# Patient Record
Sex: MALE | Race: WHITE
[De-identification: names, ages, dates, MRNs, and addresses within clinical notes are randomized per-mention and may not be internally consistent; named-entity substitution may affect disease eponyms.]

---

## 2022-03-18 PROBLEM — F90.2 ATTENTION DEFICIT HYPERACTIVITY DISORDER (ADHD), COMBINED TYPE: Status: ACTIVE | Noted: 2022-01-24

## 2022-03-18 PROBLEM — F95.2 TOURETTE'S: Status: ACTIVE | Noted: 2022-01-24

## 2022-03-18 PROBLEM — K21.9 CHRONIC GERD: Status: ACTIVE | Noted: 2022-01-24

## 2022-03-19 PROBLEM — F95.2 TOURETTE'S DISORDER: Status: ACTIVE | Noted: 2022-01-24

## 2022-03-19 PROBLEM — G43.009 MIGRAINE WITHOUT AURA AND WITHOUT STATUS MIGRAINOSUS, NOT INTRACTABLE: Status: ACTIVE | Noted: 2022-01-24

## 2022-03-20 PROBLEM — F41.9 CHRONIC ANXIETY: Status: ACTIVE | Noted: 2022-01-24

## 2022-06-13 ENCOUNTER — OFFICE VISIT (OUTPATIENT)
Dept: FAMILY MEDICINE CLINIC | Facility: CLINIC | Age: 22
End: 2022-06-13
Payer: COMMERCIAL

## 2022-06-13 VITALS
DIASTOLIC BLOOD PRESSURE: 78 MMHG | BODY MASS INDEX: 25.61 KG/M2 | SYSTOLIC BLOOD PRESSURE: 120 MMHG | WEIGHT: 169 LBS | HEIGHT: 68 IN

## 2022-06-13 DIAGNOSIS — L30.9 ECZEMA OF BOTH HANDS: ICD-10-CM

## 2022-06-13 DIAGNOSIS — F95.2 TOURETTE'S DISORDER: ICD-10-CM

## 2022-06-13 DIAGNOSIS — K21.9 CHRONIC GERD: ICD-10-CM

## 2022-06-13 DIAGNOSIS — G43.009 MIGRAINE WITHOUT AURA AND WITHOUT STATUS MIGRAINOSUS, NOT INTRACTABLE: ICD-10-CM

## 2022-06-13 DIAGNOSIS — F41.9 CHRONIC ANXIETY: ICD-10-CM

## 2022-06-13 DIAGNOSIS — F90.2 ATTENTION DEFICIT HYPERACTIVITY DISORDER (ADHD), COMBINED TYPE: Primary | ICD-10-CM

## 2022-06-13 PROCEDURE — 99214 OFFICE O/P EST MOD 30 MIN: CPT | Performed by: FAMILY MEDICINE

## 2022-06-13 RX ORDER — METHYLPHENIDATE HYDROCHLORIDE 10 MG/1
10 TABLET ORAL DAILY
Qty: 30 TABLET | Refills: 0 | Status: SHIPPED | OUTPATIENT
Start: 2022-08-12 | End: 2022-09-13 | Stop reason: SDUPTHER

## 2022-06-13 RX ORDER — METHYLPHENIDATE HYDROCHLORIDE 10 MG/1
10 TABLET ORAL DAILY
Qty: 30 TABLET | Refills: 0 | Status: SHIPPED | OUTPATIENT
Start: 2022-07-13 | End: 2022-09-13 | Stop reason: SDUPTHER

## 2022-06-13 RX ORDER — METHYLPHENIDATE HYDROCHLORIDE 10 MG/1
10 TABLET ORAL DAILY
Qty: 30 TABLET | Refills: 0 | Status: SHIPPED | OUTPATIENT
Start: 2022-06-13 | End: 2022-09-13 | Stop reason: SDUPTHER

## 2022-06-13 NOTE — PROGRESS NOTES
[address] [place] [address]  _______________________________________  Shamar Laws MD                 [address] [place] [address] Drive,  [address]. [place], [facility]                     [address]                                                                                    Phone: [phone]                                                                                    Fax: [phone]    Kane Navarro is a 25 y.o. male who is seen for evaluation of   Chief Complaint   Patient presents with    ADHD     Stable.  Migraine     Improved migraine severity since the last 3 months.  Anxiety     Pt's mom has noticed OCD symptoms/tourretts symptoms are different. Pt's mom has noticed he has to make contact with objects with his feet when walking. He does not like to use his hands to turn on a sink or touch a doorknob. His verbal tic has improved. HPI:   Arnol Chan is a 23 y/o M with a h/o ADD, tourette's, GERD and chronic migraines, here for f/u.      - ADHD- Arnol Chan has been on medication since age 11. Pt has a degree in [employer] and works at [employer] in [facility]. He reports that his s/s are stable on current dose of Ritalin.      - Tourette's/OCD- diagnosed at age 5. Currently on topamax and clonidine. Presents as a cough, which was initially felt to be allergic etiology. Dose was adjusted 1.5 yrs ago, but reports no benefit. OCD usually manifests with frequent hand washing. Mom concerned that pt may be having significantly more anxiety. Recently has been very concerned that if he does not spend enough time praying or reading the Bible, he will spend eternity in hell. Pt agreed to trial on lexapro last visit. Pt and mother, who accompanies pt, feels his anxiety is somewhat improved since starting lexapro.  Mother feels that Arnol Chan has had some more pronounced walking patterns and kicking, which have been associated with tics in the past, but this has not limited his life functions.      - migraines- historically, pt takes Imitrex or naproxyn for h/a. He often has profound nausea with h/a, at times zofran does not resolve nausea and pt has required UC. Pt has had only mild h/a since our last visit, no vomiting, improved from 1-2 h/a monthly.      - GERD- prilosec for a number of years only once daily. Reports s/s are well controlled. - eczema- battled for a number of years. Since switching to free and clear detergent, pt's eczema has greatly improved.      - excessive cerumen- pt has used debrox in the past. Tends to have difficulty clearing. Review of Systems:  Review of Systems     History:  Past Medical History:   Diagnosis Date    ADD (attention deficit disorder)     Allergic rhinitis     Asthma     Migraine     Tourette's        Past Surgical History:   Procedure Laterality Date    ADENOIDECTOMY      IR BRONCHOSCOPY      TYMPANOPLASTY      WISDOM TOOTH EXTRACTION  2019       Family History   Problem Relation Age of Onset    Thyroid Disease Mother     Diabetes Father        Social History     Tobacco Use    Smoking status: Never Smoker    Smokeless tobacco: Never Used   Substance Use Topics    Alcohol use: Not Currently       Allergies   Allergen Reactions    Latex Rash       Current Outpatient Medications   Medication Sig Dispense Refill    methylphenidate (RITALIN) 10 MG tablet Take 1 tablet by mouth daily for 30 days. 30 tablet 0    [START ON 7/13/2022] methylphenidate (RITALIN) 10 MG tablet Take 1 tablet by mouth daily for 30 days. 30 tablet 0    [START ON 8/12/2022] methylphenidate (RITALIN) 10 MG tablet Take 1 tablet by mouth daily for 30 days.  30 tablet 0    cetirizine (ZYRTEC) 10 MG tablet Take 10 mg by mouth daily      cloNIDine (CATAPRES) 0.3 MG tablet Take 0.3 mg by mouth 2 times daily      escitalopram (LEXAPRO) 10 MG tablet Take 10 mg by mouth daily      melatonin 5 MG TABS tablet Take 5 mg by mouth      methylphenidate (RITALIN) 10 MG tablet Take 10 mg by mouth daily.  omeprazole (PRILOSEC) 20 MG delayed release capsule Take 20 mg by mouth daily      promethazine (PHENERGAN) 25 MG suppository Place 25 mg rectally every 6 hours as needed      topiramate (TOPAMAX) 200 MG tablet Take 200 mg by mouth       No current facility-administered medications for this visit. Vitals:    /78 (Site: Left Upper Arm, Position: Sitting, Cuff Size: Small Adult)   Ht 5' 8\" (1.727 m)   Wt 169 lb (76.7 kg)   BMI 25.70 kg/m²     Physical Exam:  Physical Exam  Vitals reviewed. Constitutional:       General: He is not in acute distress. Appearance: Normal appearance. He is not ill-appearing. HENT:      Head: Normocephalic and atraumatic. Right Ear: Tympanic membrane and ear canal normal. There is no impacted cerumen. Left Ear: Tympanic membrane and ear canal normal. There is no impacted cerumen. Nose: No congestion or rhinorrhea. Mouth/Throat:      Mouth: Mucous membranes are moist.      Pharynx: No oropharyngeal exudate or posterior oropharyngeal erythema. Eyes:      General: No scleral icterus. Right eye: No discharge. Left eye: No discharge. Extraocular Movements: Extraocular movements intact. Conjunctiva/sclera: Conjunctivae normal.      Pupils: Pupils are equal, round, and reactive to light. Cardiovascular:      Rate and Rhythm: Normal rate and regular rhythm. Pulses: Normal pulses. Heart sounds: No murmur heard. No friction rub. No gallop. Pulmonary:      Effort: Pulmonary effort is normal. No respiratory distress. Breath sounds: No stridor. No wheezing, rhonchi or rales. Chest:      Chest wall: No tenderness. Abdominal:      General: Abdomen is flat. Bowel sounds are normal. There is no distension. Palpations: Abdomen is soft. There is no mass. Tenderness: There is no abdominal tenderness. There is no right CVA tenderness or left CVA tenderness.    Musculoskeletal: General: No tenderness. Normal range of motion. Cervical back: Normal range of motion and neck supple. No rigidity or tenderness. Right lower leg: No edema. Left lower leg: No edema. Lymphadenopathy:      Cervical: No cervical adenopathy. Skin:     General: Skin is warm and dry. Capillary Refill: Capillary refill takes less than 2 seconds. Findings: No lesion or rash. Neurological:      General: No focal deficit present. Mental Status: He is alert and oriented to person, place, and time. Mental status is at baseline. Sensory: No sensory deficit. Motor: No weakness. Coordination: Coordination normal.      Gait: Gait normal.   Psychiatric:         Mood and Affect: Mood normal.         Behavior: Behavior normal.         Thought Content: Thought content normal.         Judgment: Judgment normal.         Assessment/Plan:     ICD-10-CM    1. Attention deficit hyperactivity disorder (ADHD), combined type  F90.2 methylphenidate (RITALIN) 10 MG tablet     methylphenidate (RITALIN) 10 MG tablet     methylphenidate (RITALIN) 10 MG tablet   2. Tourette's disorder  F95.2    3. Migraine without aura and without status migrainosus, not intractable  G43.009    4. Chronic GERD  K21.9    5. Chronic anxiety  F41.9    6. Eczema of both hands  L30.9         Problem List        Digestive    Chronic GERD      Continue ppi for now. Plan to taper as his anxiety is better controlled. Reinforce lifestyle modifications. Relevant Medications    omeprazole (PRILOSEC) 20 MG delayed release capsule       Nervous and Auditory    Migraine without aura and without status migrainosus, not intractable      Improved frequency. Imitrex + zofran seems to be appropriate abortive tx. Relevant Medications    escitalopram (LEXAPRO) 10 MG tablet    topiramate (TOPAMAX) 200 MG tablet    Tourette's disorder     S/s are stable on this regimen.  Recent increase in tics does not appear to be limiting pt. Will continue to monitor for now. Musculoskeletal and Integument    Eczema of both hands      Improved with changing laundry detergents. Other    Attention deficit hyperactivity disorder (ADHD), combined type - Primary      PDMP queried and appropriate. Will continue same dose. Relevant Medications    methylphenidate (RITALIN) 10 MG tablet    methylphenidate (RITALIN) 10 MG tablet (Start on 7/13/2022)    methylphenidate (RITALIN) 10 MG tablet (Start on 8/12/2022)    Chronic anxiety      Lexapro is currently controlling anxiety very well.           Relevant Medications    escitalopram (LEXAPRO) 10 MG tablet           Twila Condon III, MD

## 2022-06-14 PROBLEM — L30.9 ECZEMA OF BOTH HANDS: Status: ACTIVE | Noted: 2022-06-14

## 2022-06-14 NOTE — ASSESSMENT & PLAN NOTE
Continue ppi for now. Plan to taper as his anxiety is better controlled. Reinforce lifestyle modifications.

## 2022-06-14 NOTE — ASSESSMENT & PLAN NOTE
S/s are stable on this regimen. Recent increase in tics does not appear to be limiting pt. Will continue to monitor for now.

## 2022-09-13 ENCOUNTER — OFFICE VISIT (OUTPATIENT)
Dept: FAMILY MEDICINE CLINIC | Facility: CLINIC | Age: 22
End: 2022-09-13
Payer: COMMERCIAL

## 2022-09-13 VITALS
HEART RATE: 103 BPM | DIASTOLIC BLOOD PRESSURE: 80 MMHG | BODY MASS INDEX: 26.98 KG/M2 | HEIGHT: 68 IN | SYSTOLIC BLOOD PRESSURE: 130 MMHG | WEIGHT: 178 LBS | OXYGEN SATURATION: 99 %

## 2022-09-13 DIAGNOSIS — F90.2 ATTENTION DEFICIT HYPERACTIVITY DISORDER (ADHD), COMBINED TYPE: Primary | ICD-10-CM

## 2022-09-13 DIAGNOSIS — G43.009 MIGRAINE WITHOUT AURA AND WITHOUT STATUS MIGRAINOSUS, NOT INTRACTABLE: ICD-10-CM

## 2022-09-13 DIAGNOSIS — L30.9 ECZEMA OF BOTH HANDS: ICD-10-CM

## 2022-09-13 DIAGNOSIS — L30.9 DERMATITIS: ICD-10-CM

## 2022-09-13 DIAGNOSIS — K21.9 CHRONIC GERD: ICD-10-CM

## 2022-09-13 PROCEDURE — 99214 OFFICE O/P EST MOD 30 MIN: CPT | Performed by: FAMILY MEDICINE

## 2022-09-13 RX ORDER — METHYLPHENIDATE HYDROCHLORIDE 10 MG/1
10 TABLET ORAL DAILY
Qty: 30 TABLET | Refills: 0 | Status: SHIPPED | OUTPATIENT
Start: 2022-11-12 | End: 2022-12-12

## 2022-09-13 RX ORDER — METHYLPHENIDATE HYDROCHLORIDE 10 MG/1
10 TABLET ORAL DAILY
Qty: 30 TABLET | Refills: 0 | Status: SHIPPED | OUTPATIENT
Start: 2022-10-13 | End: 2022-11-12

## 2022-09-13 RX ORDER — SUMATRIPTAN 25 MG/1
25 TABLET, FILM COATED ORAL
Qty: 27 TABLET | Refills: 3 | Status: SHIPPED | OUTPATIENT
Start: 2022-09-13 | End: 2022-09-13

## 2022-09-13 RX ORDER — NAPROXEN 500 MG/1
500 TABLET ORAL 2 TIMES DAILY WITH MEALS
Qty: 180 TABLET | Refills: 1 | Status: SHIPPED | OUTPATIENT
Start: 2022-09-13

## 2022-09-13 RX ORDER — ONDANSETRON 4 MG/1
4 TABLET, FILM COATED ORAL DAILY PRN
Qty: 30 TABLET | Refills: 0 | Status: SHIPPED | OUTPATIENT
Start: 2022-09-13

## 2022-09-13 RX ORDER — METHYLPHENIDATE HYDROCHLORIDE 10 MG/1
10 TABLET ORAL DAILY
Qty: 30 TABLET | Refills: 0 | Status: SHIPPED | OUTPATIENT
Start: 2022-09-13 | End: 2022-10-13

## 2022-09-13 ASSESSMENT — ENCOUNTER SYMPTOMS
CONSTIPATION: 0
COLOR CHANGE: 0
WHEEZING: 0
SHORTNESS OF BREATH: 0
SINUS PAIN: 0
EYE REDNESS: 0
BLOOD IN STOOL: 0
ABDOMINAL PAIN: 0
NAUSEA: 0
EYE ITCHING: 0
VOMITING: 0
DIARRHEA: 0
STRIDOR: 0
SINUS PRESSURE: 0
SORE THROAT: 0
ABDOMINAL DISTENTION: 0
CHEST TIGHTNESS: 0
BACK PAIN: 0
COUGH: 0
EYE PAIN: 0
EYE DISCHARGE: 0
RHINORRHEA: 0
FACIAL SWELLING: 0

## 2022-09-13 NOTE — PROGRESS NOTES
[address] [place] [address]  _______________________________________  Al Nur MD                 [address] [place] [address],  [address]. [place], [place]                     [address]                                                                                    Phone: [phone]                                                                                    Fax: [phone]    Renetta Ferrari is a 25 y.o. male who is seen for evaluation of   Chief Complaint   Patient presents with    ADHD     Doing well on medication, pt feels focused throughout the day. Anxiety     Pt hasn't noted any changes with tics since last visit - he hasn't noticed if he is still kicking objects when walking by things. HPI:   Luis Todd is a 23 y/o M with a h/o ADD, tourette's, GERD and chronic migraines, here for f/u. - ADHD- Luis Todd has been on medication since age 11. Pt has a degree in [employer] and works at [employer] in [employer]. He reports that his s/s are stable on current dose of Ritalin. Denies any medication s/e.      - Tourette's/OCD- diagnosed at age 5. Currently on topamax and clonidine. Presents as a cough, which was initially felt to be allergic etiology. Dose was adjusted 1.5 yrs ago, but reports no benefit. OCD usually manifests with frequent hand washing. In the past, pt was very concerned that if he did not spend enough time praying or reading the Bible, he would spend eternity in hell. Pt agreed to trial on lexapro. Pt feels his anxiety is somewhat improved since starting lexapro. His mother has reported that Luis Todd has had less pronounced walking patterns and kicking, which have been associated with tics in the past, but this has not limited his life functions. - migraines- historically, pt takes Imitrex or naproxyn for h/a. He often has profound nausea with h/a, at times zofran does not resolve nausea and pt has required UC.  Pt has had one h/a since our last visit that was triggered by EToH. He reports vomiting responds to zofran and h/a to imitrex and naprosyn.      - GERD- prilosec for a number of years only once daily. Reports some increased s/s lately, but cannot attribute to any lifestyle/dietary changes. - eczema- battled for a number of years. Since switching to free and clear detergent, pt's eczema has greatly improved. He has noticed a small flare on L wrist and b/l anticubital areas. He uses triamcinolone usually, but has not restarted it.      - excessive cerumen- pt has used debrox in the past. Tends to have difficulty clearing.     - c/o new lesion above lip and sides of nose with bumps. Review of Systems:  Review of Systems   Constitutional:  Negative for activity change, appetite change, chills, diaphoresis, fatigue, fever and unexpected weight change. HENT:  Negative for congestion, ear discharge, ear pain, facial swelling, hearing loss, mouth sores, nosebleeds, postnasal drip, rhinorrhea, sinus pressure, sinus pain, sore throat and tinnitus. Eyes:  Negative for pain, discharge, redness, itching and visual disturbance. Respiratory:  Negative for cough, chest tightness, shortness of breath, wheezing and stridor. Cardiovascular:  Negative for chest pain, palpitations and leg swelling. Gastrointestinal:  Negative for abdominal distention, abdominal pain, blood in stool, constipation, diarrhea, nausea and vomiting. Endocrine: Negative for cold intolerance, heat intolerance, polydipsia, polyphagia and polyuria. Genitourinary:  Negative for decreased urine volume, difficulty urinating, dysuria, flank pain, frequency, hematuria and urgency. Musculoskeletal:  Negative for arthralgias, back pain, gait problem, joint swelling, myalgias and neck pain. Skin:  Positive for rash. Negative for color change, pallor and wound.    Neurological:  Negative for dizziness, tremors, seizures, syncope, facial asymmetry, speech difficulty, weakness, light-headedness, numbness and headaches. Psychiatric/Behavioral:  Negative for agitation, behavioral problems, confusion, hallucinations, self-injury, sleep disturbance and suicidal ideas. The patient is not nervous/anxious. History:  Past Medical History:   Diagnosis Date    ADD (attention deficit disorder)     Allergic rhinitis     Asthma     Migraine     Tourette's        Past Surgical History:   Procedure Laterality Date    ADENOIDECTOMY      IR BRONCHOSCOPY      TYMPANOPLASTY      WISDOM TOOTH EXTRACTION  2019       Family History   Problem Relation Age of Onset    Thyroid Disease Mother     Diabetes Father        Social History     Tobacco Use    Smoking status: Never    Smokeless tobacco: Never   Substance Use Topics    Alcohol use: Not Currently       Allergies   Allergen Reactions    Latex Rash       Current Outpatient Medications   Medication Sig Dispense Refill    [START ON 11/12/2022] methylphenidate (RITALIN) 10 MG tablet Take 1 tablet by mouth daily for 30 days. 30 tablet 0    methylphenidate (RITALIN) 10 MG tablet Take 1 tablet by mouth daily for 30 days. 30 tablet 0    [START ON 10/13/2022] methylphenidate (RITALIN) 10 MG tablet Take 1 tablet by mouth daily for 30 days. 30 tablet 0    naproxen (NAPROSYN) 500 MG tablet Take 1 tablet by mouth 2 times daily (with meals) 180 tablet 1    SUMAtriptan (IMITREX) 25 MG tablet Take 1 tablet by mouth once as needed for Migraine 27 tablet 3    ondansetron (ZOFRAN) 4 MG tablet Take 1 tablet by mouth daily as needed for Nausea or Vomiting 30 tablet 0    mupirocin (BACTROBAN) 2 % ointment Apply topically 3 times daily. 15 g 0    cetirizine (ZYRTEC) 10 MG tablet Take 10 mg by mouth daily      cloNIDine (CATAPRES) 0.3 MG tablet Take 0.3 mg by mouth 2 times daily      escitalopram (LEXAPRO) 10 MG tablet Take 10 mg by mouth daily      melatonin 5 MG TABS tablet Take 5 mg by mouth      methylphenidate (RITALIN) 10 MG tablet Take 10 mg by mouth daily. omeprazole (PRILOSEC) 20 MG delayed release capsule Take 20 mg by mouth daily      promethazine (PHENERGAN) 25 MG suppository Place 25 mg rectally every 6 hours as needed      topiramate (TOPAMAX) 200 MG tablet Take 200 mg by mouth       No current facility-administered medications for this visit. Vitals:    /80 (Site: Left Upper Arm, Position: Sitting, Cuff Size: Small Adult)   Pulse (!) 103   Ht 5' 8\" (1.727 m)   Wt 178 lb (80.7 kg)   SpO2 99%   BMI 27.06 kg/m²     Physical Exam:  Physical Exam  Vitals reviewed. Constitutional:       General: He is not in acute distress. Appearance: Normal appearance. He is not ill-appearing, toxic-appearing or diaphoretic. HENT:      Head: Normocephalic and atraumatic. Right Ear: Tympanic membrane, ear canal and external ear normal. There is no impacted cerumen. Left Ear: Tympanic membrane, ear canal and external ear normal. There is no impacted cerumen. Nose: Nose normal. No congestion or rhinorrhea. Mouth/Throat:      Mouth: Mucous membranes are moist.      Pharynx: No oropharyngeal exudate or posterior oropharyngeal erythema. Eyes:      General: No scleral icterus. Right eye: No discharge. Left eye: No discharge. Extraocular Movements: Extraocular movements intact. Conjunctiva/sclera: Conjunctivae normal.      Pupils: Pupils are equal, round, and reactive to light. Cardiovascular:      Rate and Rhythm: Normal rate and regular rhythm. Pulses: Normal pulses. Heart sounds: Normal heart sounds. No murmur heard. No friction rub. No gallop. Pulmonary:      Effort: Pulmonary effort is normal. No respiratory distress. Breath sounds: Normal breath sounds. No stridor. No wheezing, rhonchi or rales. Chest:      Chest wall: No tenderness. Abdominal:      General: Abdomen is flat. Bowel sounds are normal. There is no distension. Palpations: Abdomen is soft. There is no mass. Tenderness: There is no abdominal tenderness. There is no right CVA tenderness, left CVA tenderness, guarding or rebound. Musculoskeletal:         General: No swelling, tenderness, deformity or signs of injury. Cervical back: Neck supple. No rigidity or tenderness. Right lower leg: No edema. Left lower leg: No edema. Lymphadenopathy:      Cervical: No cervical adenopathy. Skin:     General: Skin is warm and dry. Coloration: Skin is not jaundiced or pale. Findings: No bruising, erythema, lesion or rash. Neurological:      General: No focal deficit present. Mental Status: He is alert. Mental status is at baseline. Motor: No weakness. Coordination: Coordination normal.      Gait: Gait normal.   Psychiatric:         Mood and Affect: Mood normal.         Behavior: Behavior normal.         Thought Content: Thought content normal.         Judgment: Judgment normal.       Assessment/Plan:     ICD-10-CM    1. Attention deficit hyperactivity disorder (ADHD), combined type  F90.2 methylphenidate (RITALIN) 10 MG tablet     methylphenidate (RITALIN) 10 MG tablet     methylphenidate (RITALIN) 10 MG tablet      2. Dermatitis  L30.9 mupirocin (BACTROBAN) 2 % ointment      3. Chronic GERD  K21.9 ondansetron (ZOFRAN) 4 MG tablet      4. Migraine without aura and without status migrainosus, not intractable  G43.009 naproxen (NAPROSYN) 500 MG tablet     SUMAtriptan (IMITREX) 25 MG tablet      5. Eczema of both hands  L30.9            Problem List          Digestive    Chronic GERD      EToH may have triggered some episodes. Continue ppi and reinforced diet. Educated on lifestyle modifications with instructions to reduce large meals, particularly before bedtime; reduce spicy foods, caffeine, alcohol and acidic foods/drinks, avoid smoking.             Relevant Medications    omeprazole (PRILOSEC) 20 MG delayed release capsule    ondansetron (ZOFRAN) 4 MG tablet       Nervous and Auditory Migraine without aura and without status migrainosus, not intractable      H/a frequency improved. Responds well to imitrex. Relevant Medications    escitalopram (LEXAPRO) 10 MG tablet    topiramate (TOPAMAX) 200 MG tablet    naproxen (NAPROSYN) 500 MG tablet    SUMAtriptan (IMITREX) 25 MG tablet       Musculoskeletal and Integument    Eczema of both hands      Restart triamcinolone TID x 7-10 days. Encouraged to switch to hypoallergenic Dove white bar soap, free and clear laundry detergent, avoid fabric softener dryer sheets and fragranced cosmetics, apply petroleum jelly to affected areas prn to keep moist.           Dermatitis      Start bactroban. RTC if worse. Relevant Medications    mupirocin (BACTROBAN) 2 % ointment       Other    Attention deficit hyperactivity disorder (ADHD), combined type - Primary      Stable on this dose of ritalin. Continue same.           Relevant Medications    methylphenidate (RITALIN) 10 MG tablet (Start on 11/12/2022)    methylphenidate (RITALIN) 10 MG tablet    methylphenidate (RITALIN) 10 MG tablet (Start on 10/13/2022)        Seb Ramirez MD

## 2022-09-13 NOTE — ASSESSMENT & PLAN NOTE
EToH may have triggered some episodes. Continue ppi and reinforced diet. Educated on lifestyle modifications with instructions to reduce large meals, particularly before bedtime; reduce spicy foods, caffeine, alcohol and acidic foods/drinks, avoid smoking.

## 2022-09-13 NOTE — ASSESSMENT & PLAN NOTE
Restart triamcinolone TID x 7-10 days.  Encouraged to switch to hypoallergenic Dove white bar soap, free and clear laundry detergent, avoid fabric softener dryer sheets and fragranced cosmetics, apply petroleum jelly to affected areas prn to keep moist.

## 2022-12-13 ENCOUNTER — OFFICE VISIT (OUTPATIENT)
Dept: FAMILY MEDICINE CLINIC | Facility: CLINIC | Age: 22
End: 2022-12-13
Payer: COMMERCIAL

## 2022-12-13 VITALS
BODY MASS INDEX: 27.43 KG/M2 | DIASTOLIC BLOOD PRESSURE: 88 MMHG | SYSTOLIC BLOOD PRESSURE: 130 MMHG | HEART RATE: 77 BPM | OXYGEN SATURATION: 97 % | HEIGHT: 68 IN | WEIGHT: 181 LBS

## 2022-12-13 DIAGNOSIS — J30.2 SEASONAL ALLERGIC RHINITIS, UNSPECIFIED TRIGGER: ICD-10-CM

## 2022-12-13 DIAGNOSIS — F41.9 CHRONIC ANXIETY: ICD-10-CM

## 2022-12-13 DIAGNOSIS — F90.2 ATTENTION DEFICIT HYPERACTIVITY DISORDER (ADHD), COMBINED TYPE: Primary | ICD-10-CM

## 2022-12-13 DIAGNOSIS — K21.9 CHRONIC GERD: ICD-10-CM

## 2022-12-13 DIAGNOSIS — F95.2 TOURETTE'S: ICD-10-CM

## 2022-12-13 DIAGNOSIS — G43.009 MIGRAINE WITHOUT AURA AND WITHOUT STATUS MIGRAINOSUS, NOT INTRACTABLE: ICD-10-CM

## 2022-12-13 PROCEDURE — G8427 DOCREV CUR MEDS BY ELIG CLIN: HCPCS | Performed by: FAMILY MEDICINE

## 2022-12-13 PROCEDURE — G8484 FLU IMMUNIZE NO ADMIN: HCPCS | Performed by: FAMILY MEDICINE

## 2022-12-13 PROCEDURE — 1036F TOBACCO NON-USER: CPT | Performed by: FAMILY MEDICINE

## 2022-12-13 PROCEDURE — G8419 CALC BMI OUT NRM PARAM NOF/U: HCPCS | Performed by: FAMILY MEDICINE

## 2022-12-13 PROCEDURE — 99214 OFFICE O/P EST MOD 30 MIN: CPT | Performed by: FAMILY MEDICINE

## 2022-12-13 RX ORDER — METHYLPHENIDATE HYDROCHLORIDE 10 MG/1
10 TABLET ORAL DAILY
Qty: 30 TABLET | Refills: 0 | Status: SHIPPED | OUTPATIENT
Start: 2023-02-11 | End: 2023-03-13

## 2022-12-13 RX ORDER — METHYLPHENIDATE HYDROCHLORIDE 10 MG/1
10 TABLET ORAL DAILY
Qty: 30 TABLET | Refills: 0 | Status: SHIPPED | OUTPATIENT
Start: 2022-12-13 | End: 2023-01-12

## 2022-12-13 RX ORDER — AZELASTINE 1 MG/ML
1 SPRAY, METERED NASAL 2 TIMES DAILY
Qty: 60 ML | Refills: 1 | Status: SHIPPED | OUTPATIENT
Start: 2022-12-13

## 2022-12-13 RX ORDER — METHYLPHENIDATE HYDROCHLORIDE 10 MG/1
10 TABLET ORAL DAILY
Qty: 30 TABLET | Refills: 0 | Status: SHIPPED | OUTPATIENT
Start: 2023-01-12 | End: 2023-02-11

## 2022-12-13 ASSESSMENT — ENCOUNTER SYMPTOMS
BACK PAIN: 0
COLOR CHANGE: 0
DIARRHEA: 0
EYE PAIN: 0
ABDOMINAL DISTENTION: 0
SHORTNESS OF BREATH: 0
SINUS PRESSURE: 0
EYE ITCHING: 0
VOMITING: 0
STRIDOR: 0
SORE THROAT: 0
WHEEZING: 0
ABDOMINAL PAIN: 0
EYE REDNESS: 0
FACIAL SWELLING: 0
CONSTIPATION: 0
EYE DISCHARGE: 0
RHINORRHEA: 1
CHEST TIGHTNESS: 0
COUGH: 0
SINUS PAIN: 0
BLOOD IN STOOL: 0
NAUSEA: 0

## 2022-12-13 NOTE — ASSESSMENT & PLAN NOTE
Continue ppi. Educated on lifestyle modifications with instructions to reduce large meals, particularly before bedtime; reduce spicy foods, caffeine, alcohol and acidic foods/drinks, avoid smoking.

## 2022-12-13 NOTE — PROGRESS NOTES
[address] [place] [address]  _______________________________________  Deena Hanna MD                 [address] [place] [address],  [address]. [place], [address] [place] [address]                     Hawk Mcgrath [address]                                                                                    Phone: [phone]                                                                                    Fax: [phone]    Jennifer Rincon is a 25 y.o. male who is seen for evaluation of   Chief Complaint   Patient presents with    ADHD    Anxiety    Other     Tourette's  GERD       HPI:   Lex Alfaro is a 23 y/o M with a h/o ADD, tourette's, GERD and chronic migraines, here for f/u. - ADHD- Lex Alfaro has been on medication since age 11. Pt has a degree in [employer] and works at [employer] in [place]. He reports that his s/s are stable on current dose of Ritalin. Denies any medication s/e.      - Tourette's/OCD- diagnosed at age 5. Currently on topamax and clonidine. Presents as a cough, which was initially felt to be allergic etiology. Dose was adjusted 1.5 yrs ago, but reports no benefit. OCD usually manifests with frequent hand washing. In the past, pt was very concerned that if he did not spend enough time praying or reading the Bible, he would spend eternity in hell. Pt agreed to trial on lexapro. Pt feels his anxiety is somewhat improved since starting lexapro. His mother has reported that Lex Alfaro has had less pronounced walking patterns and kicking, which have been associated with tics in the past, but this has not limited his life functions. - migraines- historically, pt takes Imitrex or naproxyn for h/a. He often has profound nausea with h/a, at times zofran does not resolve nausea and pt has required UC. Pt has had one h/a since our last visit that was triggered by EToH. He reports vomiting responds to zofran and h/a to imitrex and naprosyn.      - GERD- prilosec for a number of years only once daily.  Reports some increased s/s lately, but cannot attribute to any lifestyle/dietary changes. - eczema- battled for a number of years. Since switching to free and clear detergent, pt's eczema has greatly improved. He has noticed a small flare on L wrist and b/l anticubital areas. He uses triamcinolone usually, but has not restarted it.      - excessive cerumen- pt has used debrox in the past. Tends to have difficulty clearing.     - c/o rhinorrhea that is blood tinged at times. Pt states he has used flonase in the past, but stopped last summer. He is currently on cetirizine daily. Denies any sinus pain/pressure, sore throat, fever, chills. Review of Systems:  Review of Systems   Constitutional:  Negative for activity change, appetite change, chills, diaphoresis, fatigue, fever and unexpected weight change. HENT:  Positive for rhinorrhea. Negative for congestion, ear discharge, ear pain, facial swelling, hearing loss, mouth sores, nosebleeds, postnasal drip, sinus pressure, sinus pain, sore throat and tinnitus. Eyes:  Negative for pain, discharge, redness, itching and visual disturbance. Respiratory:  Negative for cough, chest tightness, shortness of breath, wheezing and stridor. Cardiovascular:  Negative for chest pain, palpitations and leg swelling. Gastrointestinal:  Negative for abdominal distention, abdominal pain, blood in stool, constipation, diarrhea, nausea and vomiting. Endocrine: Negative for cold intolerance, heat intolerance, polydipsia, polyphagia and polyuria. Genitourinary:  Negative for decreased urine volume, difficulty urinating, dysuria, flank pain, frequency, hematuria and urgency. Musculoskeletal:  Negative for arthralgias, back pain, gait problem, joint swelling, myalgias and neck pain. Skin:  Negative for color change, pallor, rash and wound.    Neurological:  Negative for dizziness, tremors, seizures, syncope, facial asymmetry, speech difficulty, weakness, light-headedness, numbness and headaches. Psychiatric/Behavioral:  Negative for agitation, behavioral problems, confusion, hallucinations, self-injury, sleep disturbance and suicidal ideas. The patient is not nervous/anxious. History:  Past Medical History:   Diagnosis Date    ADD (attention deficit disorder)     Allergic rhinitis     Asthma     Migraine     Tourette's        Past Surgical History:   Procedure Laterality Date    ADENOIDECTOMY      IR BRONCHOSCOPY      TYMPANOPLASTY      WISDOM TOOTH EXTRACTION  2019       Family History   Problem Relation Age of Onset    Thyroid Disease Mother     Diabetes Father        Social History     Tobacco Use    Smoking status: Never    Smokeless tobacco: Never   Substance Use Topics    Alcohol use: Not Currently       Allergies   Allergen Reactions    Latex Rash       Current Outpatient Medications   Medication Sig Dispense Refill    methylphenidate (RITALIN) 10 MG tablet Take 1 tablet by mouth daily for 30 days. 30 tablet 0    [START ON 1/12/2023] methylphenidate (RITALIN) 10 MG tablet Take 1 tablet by mouth daily for 30 days. 30 tablet 0    [START ON 2/11/2023] methylphenidate (RITALIN) 10 MG tablet Take 1 tablet by mouth daily for 30 days. 30 tablet 0    azelastine (ASTELIN) 0.1 % nasal spray 1 spray by Nasal route 2 times daily Use in each nostril as directed 60 mL 1    naproxen (NAPROSYN) 500 MG tablet Take 1 tablet by mouth 2 times daily (with meals) 180 tablet 1    ondansetron (ZOFRAN) 4 MG tablet Take 1 tablet by mouth daily as needed for Nausea or Vomiting 30 tablet 0    cetirizine (ZYRTEC) 10 MG tablet Take 10 mg by mouth daily      cloNIDine (CATAPRES) 0.3 MG tablet Take 0.3 mg by mouth 2 times daily      escitalopram (LEXAPRO) 10 MG tablet Take 10 mg by mouth daily      melatonin 5 MG TABS tablet Take 5 mg by mouth      methylphenidate (RITALIN) 10 MG tablet Take 10 mg by mouth daily.       omeprazole (PRILOSEC) 20 MG delayed release capsule Take 20 mg by mouth daily      promethazine (PHENERGAN) 25 MG suppository Place 25 mg rectally every 6 hours as needed      topiramate (TOPAMAX) 200 MG tablet Take 200 mg by mouth      SUMAtriptan (IMITREX) 25 MG tablet Take 1 tablet by mouth once as needed for Migraine 27 tablet 3     No current facility-administered medications for this visit. Vitals:    /88 (Site: Right Upper Arm, Position: Sitting, Cuff Size: Small Adult)   Pulse 77   Ht 5' 8\" (1.727 m)   Wt 181 lb (82.1 kg)   SpO2 97%   BMI 27.52 kg/m²     Physical Exam:  Physical Exam  Vitals reviewed. Constitutional:       General: He is not in acute distress. Appearance: Normal appearance. He is not ill-appearing, toxic-appearing or diaphoretic. HENT:      Head: Normocephalic and atraumatic. Right Ear: Tympanic membrane, ear canal and external ear normal. There is no impacted cerumen. Left Ear: Tympanic membrane, ear canal and external ear normal. There is no impacted cerumen. Nose: Rhinorrhea present. No congestion. Right Sinus: No maxillary sinus tenderness or frontal sinus tenderness. Left Sinus: No maxillary sinus tenderness or frontal sinus tenderness. Comments: Mild R septal injection. Mouth/Throat:      Mouth: Mucous membranes are moist.      Pharynx: No oropharyngeal exudate or posterior oropharyngeal erythema. Eyes:      General: No scleral icterus. Right eye: No discharge. Left eye: No discharge. Extraocular Movements: Extraocular movements intact. Conjunctiva/sclera: Conjunctivae normal.      Pupils: Pupils are equal, round, and reactive to light. Cardiovascular:      Rate and Rhythm: Normal rate and regular rhythm. Pulses: Normal pulses. Heart sounds: Normal heart sounds. No murmur heard. No friction rub. No gallop. Pulmonary:      Effort: Pulmonary effort is normal. No respiratory distress. Breath sounds: Normal breath sounds. No stridor. No wheezing, rhonchi or rales.    Chest: Chest wall: No tenderness. Abdominal:      General: Abdomen is flat. Bowel sounds are normal. There is no distension. Palpations: Abdomen is soft. There is no mass. Tenderness: There is no abdominal tenderness. There is no right CVA tenderness, left CVA tenderness, guarding or rebound. Musculoskeletal:         General: No swelling, tenderness, deformity or signs of injury. Cervical back: Neck supple. No rigidity or tenderness. Right lower leg: No edema. Left lower leg: No edema. Lymphadenopathy:      Cervical: No cervical adenopathy. Skin:     General: Skin is warm and dry. Coloration: Skin is not jaundiced or pale. Findings: No bruising, erythema, lesion or rash. Neurological:      General: No focal deficit present. Mental Status: He is alert. Mental status is at baseline. Motor: No weakness. Coordination: Coordination normal.      Gait: Gait normal.   Psychiatric:         Mood and Affect: Mood normal.         Behavior: Behavior normal.         Thought Content: Thought content normal.         Judgment: Judgment normal.       Assessment/Plan:     ICD-10-CM    1. Attention deficit hyperactivity disorder (ADHD), combined type  F90.2 methylphenidate (RITALIN) 10 MG tablet     methylphenidate (RITALIN) 10 MG tablet     methylphenidate (RITALIN) 10 MG tablet      2. Seasonal allergic rhinitis, unspecified trigger  J30.2 azelastine (ASTELIN) 0.1 % nasal spray      3. Chronic GERD  K21.9       4. Tourette's  F95.2       5. Migraine without aura and without status migrainosus, not intractable  G43.009       6. Chronic anxiety  F41.9            Problem List          Respiratory    Seasonal allergic rhinitis      Recommended saline nasal mist and will start him on azelastine.           Relevant Medications    cetirizine (ZYRTEC) 10 MG tablet    promethazine (PHENERGAN) 25 MG suppository    azelastine (ASTELIN) 0.1 % nasal spray       Digestive    Chronic GERD Continue ppi. Educated on lifestyle modifications with instructions to reduce large meals, particularly before bedtime; reduce spicy foods, caffeine, alcohol and acidic foods/drinks, avoid smoking. Relevant Medications    omeprazole (PRILOSEC) 20 MG delayed release capsule    ondansetron (ZOFRAN) 4 MG tablet       Nervous and Auditory    Tourette's      Stable on current regimen. Migraine without aura and without status migrainosus, not intractable      Only a mild h/a since last visit. Did not require imitrex. Appears to be well controlled. Relevant Medications    escitalopram (LEXAPRO) 10 MG tablet    topiramate (TOPAMAX) 200 MG tablet    naproxen (NAPROSYN) 500 MG tablet       Other    Attention deficit hyperactivity disorder (ADHD), combined type - Primary      Doing well on current dose. No changes. Relevant Medications    methylphenidate (RITALIN) 10 MG tablet    methylphenidate (RITALIN) 10 MG tablet (Start on 1/12/2023)    methylphenidate (RITALIN) 10 MG tablet (Start on 2/11/2023)    Chronic anxiety      Stable on lexapro. Continue same dose.           Relevant Medications    escitalopram (LEXAPRO) 10 MG tablet        Gigi Wills III, MD

## 2023-01-10 ENCOUNTER — TELEMEDICINE (OUTPATIENT)
Dept: FAMILY MEDICINE CLINIC | Facility: CLINIC | Age: 23
End: 2023-01-10
Payer: COMMERCIAL

## 2023-01-10 DIAGNOSIS — J02.9 SORE THROAT: ICD-10-CM

## 2023-01-10 DIAGNOSIS — J01.90 ACUTE BACTERIAL SINUSITIS: Primary | ICD-10-CM

## 2023-01-10 DIAGNOSIS — R68.89 FLU-LIKE SYMPTOMS: ICD-10-CM

## 2023-01-10 DIAGNOSIS — Z11.52 ENCOUNTER FOR SCREENING FOR COVID-19: ICD-10-CM

## 2023-01-10 DIAGNOSIS — B96.89 ACUTE BACTERIAL SINUSITIS: Primary | ICD-10-CM

## 2023-01-10 LAB
EXP DATE SOLUTION: NORMAL
EXP DATE SWAB: NORMAL
EXPIRATION DATE: NORMAL
GROUP A STREP ANTIGEN, POC: NORMAL
LOT NUMBER POC: NORMAL
LOT NUMBER SOLUTION: NORMAL
LOT NUMBER SWAB: NORMAL
QUICKVUE INFLUENZA TEST: NEGATIVE
SARS-COV-2 RNA, POC: NEGATIVE
VALID INTERNAL CONTROL, POC: YES
VALID INTERNAL CONTROL, POC: YES

## 2023-01-10 PROCEDURE — G8427 DOCREV CUR MEDS BY ELIG CLIN: HCPCS | Performed by: FAMILY MEDICINE

## 2023-01-10 PROCEDURE — 87804 INFLUENZA ASSAY W/OPTIC: CPT | Performed by: FAMILY MEDICINE

## 2023-01-10 PROCEDURE — 87635 SARS-COV-2 COVID-19 AMP PRB: CPT | Performed by: FAMILY MEDICINE

## 2023-01-10 PROCEDURE — 99214 OFFICE O/P EST MOD 30 MIN: CPT | Performed by: FAMILY MEDICINE

## 2023-01-10 PROCEDURE — 87880 STREP A ASSAY W/OPTIC: CPT | Performed by: FAMILY MEDICINE

## 2023-01-10 RX ORDER — FLUTICASONE PROPIONATE 50 MCG
1 SPRAY, SUSPENSION (ML) NASAL DAILY
Qty: 32 G | Refills: 1 | Status: SHIPPED | OUTPATIENT
Start: 2023-01-10

## 2023-01-10 RX ORDER — AMOXICILLIN 875 MG/1
875 TABLET, COATED ORAL 2 TIMES DAILY
Qty: 14 TABLET | Refills: 0 | Status: SHIPPED | OUTPATIENT
Start: 2023-01-10 | End: 2023-01-17

## 2023-01-10 ASSESSMENT — ENCOUNTER SYMPTOMS
SHORTNESS OF BREATH: 0
ABDOMINAL PAIN: 0
VOMITING: 0
EYE PAIN: 0
DIARRHEA: 0
FACIAL SWELLING: 0
WHEEZING: 0
EYE REDNESS: 0
SINUS PAIN: 1
CHEST TIGHTNESS: 0
RHINORRHEA: 1
CONSTIPATION: 0
SORE THROAT: 1
BACK PAIN: 0
NAUSEA: 0
ABDOMINAL DISTENTION: 0
BLOOD IN STOOL: 0
EYE ITCHING: 0
COUGH: 1
COLOR CHANGE: 0
EYE DISCHARGE: 0
STRIDOR: 0
SINUS PRESSURE: 1

## 2023-01-10 NOTE — LETTER
[facility]  Phone: [phone]  Fax: [phone]    Hanna Christopher MD        January 10, 2023     Patient: Carlos Enrique Price   YOB: 2000   Date of Visit: 1/10/2023       To Whom It May Concern: It is my medical opinion that Elisa Leblanc may return to work on 1/11/23. If you have any questions or concerns, please don't hesitate to call.     Sincerely,        Hanna Christopher MD

## 2023-01-10 NOTE — PROGRESS NOTES
Kerwin Mckeon is a 25 y.o. male who was seen by synchronous (real-time) audio-video technology on 1/10/2023 for No chief complaint on file. Subjective:   C/o 1 week h/o progressively worsening sinus pain, sore throat, purulent rhinorrhea, productive cough with yellow sputum. Denies fever, chills, sob, wheezing. Results for orders placed or performed in visit on 01/10/23   AMB POC RAPID STREP A   Result Value Ref Range    Valid Internal Control, POC yes     Group A Strep Antigen, POC Neg-culture sent Negative   AMB POC COVID-19 COV   Result Value Ref Range    SARS-COV-2 RNA, POC Negative     Lot number swab [id number]     EXP date swab 2/20/23     Lot number solution      EXP date solution      LOT NUMBER POC      EXPIRATION DATE     AMB POC RAPID INFLUENZA TEST   Result Value Ref Range    Valid Internal Control, POC yes     QuickVue Influenza test Negative Negative       Prior to Admission medications    Medication Sig Start Date End Date Taking? Authorizing Provider   amoxicillin (AMOXIL) 875 MG tablet Take 1 tablet by mouth 2 times daily for 7 days 1/10/23 1/17/23 Yes Jian Feliciano III, MD   fluticasone [facility]) 50 MCG/ACT nasal spray 1 spray by Each Nostril route daily 1/10/23  Yes Chapis Pickett III, MD   methylphenidate (RITALIN) 10 MG tablet Take 1 tablet by mouth daily for 30 days. 12/13/22 1/12/23  Jian Feliciano III, MD   methylphenidate (RITALIN) 10 MG tablet Take 1 tablet by mouth daily for 30 days. 1/12/23 2/11/23  Jian Feliciano III, MD   methylphenidate (RITALIN) 10 MG tablet Take 1 tablet by mouth daily for 30 days.  2/11/23 3/13/23  Jian Feliciano III, MD   azelastine (ASTELIN) 0.1 % nasal spray 1 spray by Nasal route 2 times daily Use in each nostril as directed 12/13/22   Chapis Pickett III, MD   naproxen (NAPROSYN) 500 MG tablet Take 1 tablet by mouth 2 times daily (with meals) 9/13/22   Chapis Pickett III, MD   SUMAtriptan (IMITREX) 25 MG tablet Take 1 tablet by mouth once as needed for Migraine 9/13/22 9/13/22  Alma Ulloa III, MD   ondansetron [facility]) 4 MG tablet Take 1 tablet by mouth daily as needed for Nausea or Vomiting 9/13/22   Alma Ulloa III, MD   cetirizine (ZYRTEC) 10 MG tablet Take 10 mg by mouth daily    Ar Automatic Reconciliation   cloNIDine (CATAPRES) 0.3 MG tablet Take 0.3 mg by mouth 2 times daily 1/24/22   Ar Automatic Reconciliation   escitalopram (LEXAPRO) 10 MG tablet Take 10 mg by mouth daily 1/24/22   Ar Automatic Reconciliation   melatonin 5 MG TABS tablet Take 5 mg by mouth    Ar Automatic Reconciliation   methylphenidate (RITALIN) 10 MG tablet Take 10 mg by mouth daily. 3/30/22   Ar Automatic Reconciliation   omeprazole (PRILOSEC) 20 MG delayed release capsule Take 20 mg by mouth daily 1/24/22   Ar Automatic Reconciliation   promethazine (PHENERGAN) 25 MG suppository Place 25 mg rectally every 6 hours as needed 3/7/22   Ar Automatic Reconciliation   topiramate (TOPAMAX) 200 MG tablet Take 200 mg by mouth 1/24/22   Ar Automatic Reconciliation         Review of Systems   Constitutional:  Negative for activity change, appetite change, chills, diaphoresis, fatigue, fever and unexpected weight change. HENT:  Positive for congestion, postnasal drip, rhinorrhea, sinus pressure, sinus pain and sore throat. Negative for ear discharge, ear pain, facial swelling, hearing loss, mouth sores, nosebleeds and tinnitus. Eyes:  Negative for pain, discharge, redness, itching and visual disturbance. Respiratory:  Positive for cough. Negative for chest tightness, shortness of breath, wheezing and stridor. Cardiovascular:  Negative for chest pain, palpitations and leg swelling. Gastrointestinal:  Negative for abdominal distention, abdominal pain, blood in stool, constipation, diarrhea, nausea and vomiting. Endocrine: Negative for cold intolerance, heat intolerance, polydipsia, polyphagia and polyuria.    Genitourinary:  Negative for decreased urine volume, difficulty urinating, dysuria, flank pain, frequency, hematuria and urgency. Musculoskeletal:  Negative for arthralgias, back pain, gait problem, joint swelling, myalgias and neck pain. Skin:  Negative for color change, pallor, rash and wound. Neurological:  Negative for dizziness, tremors, seizures, syncope, facial asymmetry, speech difficulty, weakness, light-headedness, numbness and headaches. Psychiatric/Behavioral:  Negative for agitation, behavioral problems, confusion, hallucinations, self-injury, sleep disturbance and suicidal ideas. The patient is not nervous/anxious. Objective:   No flowsheet data found.      [INSTRUCTIONS:  \"[x]\" Indicates a positive item  \"[]\" Indicates a negative item  -- DELETE ALL ITEMS NOT EXAMINED]    Constitutional: [x] Appears well-developed and well-nourished [x] No apparent distress      [] Abnormal -     Mental status: [x] Alert and awake  [x] Oriented to person/place/time [x] Able to follow commands    [] Abnormal -     Eyes:   EOM    [x]  Normal    [] Abnormal -   Sclera  [x]  Normal    [] Abnormal -          Discharge [x]  None visible   [] Abnormal -     HENT: [x] Normocephalic, atraumatic  [] Abnormal -       External Ears [x] Normal  [] Abnormal -    Neck: [x] No visualized mass [] Abnormal -     Pulmonary/Chest: [x] Respiratory effort normal   [x] No visualized signs of difficulty breathing or respiratory distress        [] Abnormal -      Musculoskeletal:   [] Normal gait with no signs of ataxia         [x] Normal range of motion of neck        [] Abnormal -     Neurological:        [x] No Facial Asymmetry (Cranial nerve 7 motor function) (limited exam due to video visit)          [x] No gaze palsy        [] Abnormal -          Skin:        [x] No significant exanthematous lesions or discoloration noted on facial skin         [] Abnormal -            Psychiatric:       [x] Normal Affect [] Abnormal -        [x] No Hallucinations    Other pertinent observable physical exam findings:-    Diagnoses and all orders for this visit:    Acute bacterial sinusitis  -     amoxicillin (AMOXIL) 875 MG tablet; Take 1 tablet by mouth 2 times daily for 7 days  -     fluticasone (FLONASE) 50 MCG/ACT nasal spray; 1 spray by Each Nostril route daily    Encounter for screening for COVID-19  -     AMB POC COVID-19 COV    Flu-like symptoms  -     AMB POC RAPID INFLUENZA TEST; Future  -     AMB POC RAPID INFLUENZA TEST    Sore throat  -     AMB POC RAPID STREP A  -     Cancel: Culture, Throat; Future         Acute bacterial sinusitis   Given duration of s/s, will start abx tx. Explained importance of taking abx as prescribed and taking entire rx, increase hydration to at least 1-2 L of fluid daily. Recommend starting flonase 1 spray each nostril daily while on abx, over the counter antihistamine and saline nasal spray as needed for nasal dryness. We discussed the expected course, resolution and complications of the diagnosis(es) in detail. Medication risks, benefits, costs, interactions, and alternatives were discussed as indicated. I advised him to contact the office if his condition worsens, changes or fails to improve as anticipated. He expressed understanding with the diagnosis(es) and plan. Tonya Dixon, was evaluated through a synchronous (real-time) audio-video encounter. The patient (or guardian if applicable) is aware that this is a billable service. Verbal consent to proceed has been obtained within the past 12 months. The visit was conducted pursuant to the emergency declaration under the [address] [place] [address] authority and the [employer] and [employer] General Act. Patient identification was verified, and a caregiver was present when appropriate. The patient was located in a state where the provider was credentialed to provide care.     This visit was completely virtually using doxy. kenneth Dorsey MD

## 2023-03-06 RX ORDER — CLONIDINE HYDROCHLORIDE 0.3 MG/1
TABLET ORAL
Qty: 60 TABLET | Refills: 0 | Status: SHIPPED | OUTPATIENT
Start: 2023-03-06 | End: 2023-03-09 | Stop reason: SDUPTHER

## 2023-03-06 RX ORDER — ESCITALOPRAM OXALATE 10 MG/1
TABLET ORAL
Qty: 30 TABLET | Refills: 0 | Status: SHIPPED | OUTPATIENT
Start: 2023-03-06 | End: 2023-03-09 | Stop reason: SDUPTHER

## 2023-03-06 RX ORDER — OMEPRAZOLE 20 MG/1
CAPSULE, DELAYED RELEASE ORAL
Qty: 30 CAPSULE | Refills: 0 | Status: SHIPPED | OUTPATIENT
Start: 2023-03-06 | End: 2023-03-09 | Stop reason: SDUPTHER

## 2023-03-09 ENCOUNTER — OFFICE VISIT (OUTPATIENT)
Dept: FAMILY MEDICINE CLINIC | Facility: CLINIC | Age: 23
End: 2023-03-09
Payer: COMMERCIAL

## 2023-03-09 VITALS
SYSTOLIC BLOOD PRESSURE: 138 MMHG | WEIGHT: 186 LBS | BODY MASS INDEX: 28.19 KG/M2 | DIASTOLIC BLOOD PRESSURE: 82 MMHG | HEIGHT: 68 IN | OXYGEN SATURATION: 98 % | HEART RATE: 94 BPM

## 2023-03-09 DIAGNOSIS — F41.9 CHRONIC ANXIETY: ICD-10-CM

## 2023-03-09 DIAGNOSIS — K21.9 CHRONIC GERD: ICD-10-CM

## 2023-03-09 DIAGNOSIS — F95.2 TOURETTE'S DISORDER: ICD-10-CM

## 2023-03-09 DIAGNOSIS — G43.009 MIGRAINE WITHOUT AURA AND WITHOUT STATUS MIGRAINOSUS, NOT INTRACTABLE: ICD-10-CM

## 2023-03-09 DIAGNOSIS — J30.2 SEASONAL ALLERGIC RHINITIS, UNSPECIFIED TRIGGER: ICD-10-CM

## 2023-03-09 DIAGNOSIS — F90.2 ATTENTION DEFICIT HYPERACTIVITY DISORDER (ADHD), COMBINED TYPE: Primary | ICD-10-CM

## 2023-03-09 PROCEDURE — 1036F TOBACCO NON-USER: CPT | Performed by: FAMILY MEDICINE

## 2023-03-09 PROCEDURE — G8427 DOCREV CUR MEDS BY ELIG CLIN: HCPCS | Performed by: FAMILY MEDICINE

## 2023-03-09 PROCEDURE — G8484 FLU IMMUNIZE NO ADMIN: HCPCS | Performed by: FAMILY MEDICINE

## 2023-03-09 PROCEDURE — G8419 CALC BMI OUT NRM PARAM NOF/U: HCPCS | Performed by: FAMILY MEDICINE

## 2023-03-09 PROCEDURE — 99214 OFFICE O/P EST MOD 30 MIN: CPT | Performed by: FAMILY MEDICINE

## 2023-03-09 RX ORDER — ESCITALOPRAM OXALATE 10 MG/1
TABLET ORAL
Qty: 90 TABLET | Refills: 3 | Status: SHIPPED | OUTPATIENT
Start: 2023-03-09

## 2023-03-09 RX ORDER — METHYLPHENIDATE HYDROCHLORIDE 10 MG/1
10 TABLET ORAL 2 TIMES DAILY
Qty: 60 TABLET | Refills: 0 | Status: SHIPPED | OUTPATIENT
Start: 2023-05-08 | End: 2023-06-07

## 2023-03-09 RX ORDER — NAPROXEN 500 MG/1
500 TABLET ORAL 2 TIMES DAILY WITH MEALS
Qty: 180 TABLET | Refills: 3 | Status: SHIPPED | OUTPATIENT
Start: 2023-03-09

## 2023-03-09 RX ORDER — SUMATRIPTAN 25 MG/1
25 TABLET, FILM COATED ORAL
Qty: 27 TABLET | Refills: 3 | Status: SHIPPED | OUTPATIENT
Start: 2023-03-09 | End: 2023-03-09

## 2023-03-09 RX ORDER — CLONIDINE HYDROCHLORIDE 0.3 MG/1
TABLET ORAL
Qty: 180 TABLET | Refills: 3 | Status: SHIPPED | OUTPATIENT
Start: 2023-03-09

## 2023-03-09 RX ORDER — METHYLPHENIDATE HYDROCHLORIDE 10 MG/1
10 TABLET ORAL 2 TIMES DAILY
Qty: 60 TABLET | Refills: 0 | Status: SHIPPED | OUTPATIENT
Start: 2023-03-09 | End: 2023-04-08

## 2023-03-09 RX ORDER — AZELASTINE 1 MG/ML
1 SPRAY, METERED NASAL 2 TIMES DAILY
Qty: 60 ML | Refills: 1 | Status: SHIPPED | OUTPATIENT
Start: 2023-03-09

## 2023-03-09 RX ORDER — OMEPRAZOLE 20 MG/1
CAPSULE, DELAYED RELEASE ORAL
Qty: 90 CAPSULE | Refills: 3 | Status: SHIPPED | OUTPATIENT
Start: 2023-03-09

## 2023-03-09 RX ORDER — METHYLPHENIDATE HYDROCHLORIDE 10 MG/1
10 TABLET ORAL 2 TIMES DAILY
Qty: 60 TABLET | Refills: 0 | Status: SHIPPED | OUTPATIENT
Start: 2023-04-08 | End: 2023-05-08

## 2023-03-09 RX ORDER — ONDANSETRON 4 MG/1
4 TABLET, FILM COATED ORAL DAILY PRN
Qty: 30 TABLET | Refills: 3 | Status: SHIPPED | OUTPATIENT
Start: 2023-03-09

## 2023-03-09 RX ORDER — PROMETHAZINE HYDROCHLORIDE 25 MG/1
25 SUPPOSITORY RECTAL EVERY 6 HOURS PRN
Qty: 12 SUPPOSITORY | Refills: 3 | Status: CANCELLED | OUTPATIENT
Start: 2023-03-09 | End: 2023-06-07

## 2023-03-09 RX ORDER — FLUTICASONE PROPIONATE 50 MCG
1 SPRAY, SUSPENSION (ML) NASAL DAILY
Qty: 16 G | Refills: 3 | Status: SHIPPED | OUTPATIENT
Start: 2023-03-09

## 2023-03-09 SDOH — ECONOMIC STABILITY: FOOD INSECURITY: WITHIN THE PAST 12 MONTHS, THE FOOD YOU BOUGHT JUST DIDN'T LAST AND YOU DIDN'T HAVE MONEY TO GET MORE.: NEVER TRUE

## 2023-03-09 SDOH — ECONOMIC STABILITY: INCOME INSECURITY: HOW HARD IS IT FOR YOU TO PAY FOR THE VERY BASICS LIKE FOOD, HOUSING, MEDICAL CARE, AND HEATING?: NOT HARD AT ALL

## 2023-03-09 SDOH — ECONOMIC STABILITY: FOOD INSECURITY: WITHIN THE PAST 12 MONTHS, YOU WORRIED THAT YOUR FOOD WOULD RUN OUT BEFORE YOU GOT MONEY TO BUY MORE.: NEVER TRUE

## 2023-03-09 SDOH — ECONOMIC STABILITY: HOUSING INSECURITY
IN THE LAST 12 MONTHS, WAS THERE A TIME WHEN YOU DID NOT HAVE A STEADY PLACE TO SLEEP OR SLEPT IN A SHELTER (INCLUDING NOW)?: NO

## 2023-03-09 ASSESSMENT — ENCOUNTER SYMPTOMS
VOMITING: 0
RHINORRHEA: 0
SHORTNESS OF BREATH: 0
EYE DISCHARGE: 0
FACIAL SWELLING: 0
SINUS PRESSURE: 0
ABDOMINAL PAIN: 0
CHEST TIGHTNESS: 0
EYE REDNESS: 0
COLOR CHANGE: 0
SORE THROAT: 0
BLOOD IN STOOL: 0
STRIDOR: 0
CONSTIPATION: 0
NAUSEA: 0
EYE ITCHING: 0
BACK PAIN: 0
ABDOMINAL DISTENTION: 0
COUGH: 0
SINUS PAIN: 0
EYE PAIN: 0
WHEEZING: 0
DIARRHEA: 0

## 2023-03-09 ASSESSMENT — PATIENT HEALTH QUESTIONNAIRE - PHQ9
SUM OF ALL RESPONSES TO PHQ9 QUESTIONS 1 & 2: 0
SUM OF ALL RESPONSES TO PHQ QUESTIONS 1-9: 0
1. LITTLE INTEREST OR PLEASURE IN DOING THINGS: 0
2. FEELING DOWN, DEPRESSED OR HOPELESS: 0
SUM OF ALL RESPONSES TO PHQ QUESTIONS 1-9: 0

## 2023-03-09 NOTE — ASSESSMENT & PLAN NOTE
Improved on ppi. Educated on lifestyle modifications with instructions to reduce large meals, particularly before bedtime; reduce spicy foods, caffeine, alcohol and acidic foods/drinks, avoid smoking.

## 2023-03-09 NOTE — PROGRESS NOTES
[address] [place] [address]  _______________________________________  Ean Lucero MD                 [address] [place] [address],  [address]. [place], [address] [place] [address]                     Hawk Mcgrath [address]                                                                                    Phone: [phone]                                                                                    Fax: [phone]    Tyson Trejo is a 21 y.o. male who is seen for evaluation of   Chief Complaint   Patient presents with    ADHD     Pt started night classes and would like to increase Ritalin dose    Anxiety    Migraine       HPI:   Chaya Callejas is a 23 y/o M with a h/o ADD, tourette's, GERD and chronic migraines, here for f/u. - ADHD- Chaya Callejas has been on medication since age 11. Pt has a degree in [employer] and works at [employer] in [facility]. He  has started back to school for a [employer] and feels his dose needs to be increased. Denies any medication s/e.      - Tourette's/OCD- diagnosed at age 5. Currently on topamax and clonidine. Presents as a cough, which was initially felt to be allergic etiology. Dose was adjusted 1.5 yrs ago, but reports no benefit. OCD usually manifests with frequent hand washing. In the past, pt was very concerned that if he did not spend enough time praying or reading the Bible, he would spend eternity in hell. Pt agreed to trial on lexapro. Pt feels his anxiety is somewhat improved since starting lexapro. His mother has reported that Chaya Callejas has had less pronounced walking patterns and kicking, which have been associated with tics in the past, but this has not limited his life functions. - migraines- historically, pt takes Imitrex or naproxyn for h/a. He often has profound nausea with h/a, at times zofran does not resolve nausea and pt has required UC. Pt has had one h/a since our last visit that was triggered by EToH.  He reports vomiting responds to zofran and h/a to imitrex and naprosyn.      - GERD- prilosec for a number of years only once daily. Reports some increased s/s lately, but cannot attribute to any lifestyle/dietary changes. - eczema- battled for a number of years. Since switching to free and clear detergent, pt's eczema has greatly improved. Responding well to triamcinolone prn.      - excessive cerumen- pt has used debrox in the past. Tends to have difficulty clearing. Review of Systems:  Review of Systems   Constitutional:  Negative for activity change, appetite change, chills, diaphoresis, fatigue, fever and unexpected weight change. HENT:  Negative for congestion, ear discharge, ear pain, facial swelling, hearing loss, mouth sores, nosebleeds, postnasal drip, rhinorrhea, sinus pressure, sinus pain, sore throat and tinnitus. Eyes:  Negative for pain, discharge, redness, itching and visual disturbance. Respiratory:  Negative for cough, chest tightness, shortness of breath, wheezing and stridor. Cardiovascular:  Negative for chest pain, palpitations and leg swelling. Gastrointestinal:  Negative for abdominal distention, abdominal pain, blood in stool, constipation, diarrhea, nausea and vomiting. Endocrine: Negative for cold intolerance, heat intolerance, polydipsia, polyphagia and polyuria. Genitourinary:  Negative for decreased urine volume, difficulty urinating, dysuria, flank pain, frequency, hematuria and urgency. Musculoskeletal:  Negative for arthralgias, back pain, gait problem, joint swelling, myalgias and neck pain. Skin:  Negative for color change, pallor, rash and wound. Neurological:  Negative for dizziness, tremors, seizures, syncope, facial asymmetry, speech difficulty, weakness, light-headedness, numbness and headaches. Psychiatric/Behavioral:  Negative for agitation, behavioral problems, confusion, hallucinations, self-injury, sleep disturbance and suicidal ideas. The patient is not nervous/anxious.        History:  Past Medical History:   Diagnosis Date    ADD (attention deficit disorder)     Allergic rhinitis     Asthma     Migraine     Tourette's        Past Surgical History:   Procedure Laterality Date    ADENOIDECTOMY      IR BRONCHOSCOPY      TYMPANOPLASTY      WISDOM TOOTH EXTRACTION  2019       Family History   Problem Relation Age of Onset    Thyroid Disease Mother     Diabetes Father        Social History     Tobacco Use    Smoking status: Never    Smokeless tobacco: Never   Substance Use Topics    Alcohol use: Not Currently       Allergies   Allergen Reactions    Latex Rash       Current Outpatient Medications   Medication Sig Dispense Refill    omeprazole (PRILOSEC) 20 MG delayed release capsule Take 1 capsule by mouth once daily 90 capsule 3    cloNIDine (CATAPRES) 0.3 MG tablet Take 1 tablet by mouth twice daily 180 tablet 3    escitalopram (LEXAPRO) 10 MG tablet Take 1 tablet by mouth once daily 90 tablet 3    topiramate (TOPAMAX) 200 MG tablet Take 1 tablet by mouth nightly 90 tablet 3    fluticasone (FLONASE) 50 MCG/ACT nasal spray 1 spray by Each Nostril route daily 16 g 3    azelastine (ASTELIN) 0.1 % nasal spray 1 spray by Nasal route 2 times daily Use in each nostril as directed 60 mL 1    naproxen (NAPROSYN) 500 MG tablet Take 1 tablet by mouth 2 times daily (with meals) 180 tablet 3    SUMAtriptan (IMITREX) 25 MG tablet Take 1 tablet by mouth once as needed for Migraine 27 tablet 3    ondansetron (ZOFRAN) 4 MG tablet Take 1 tablet by mouth daily as needed for Nausea or Vomiting 30 tablet 3    methylphenidate (RITALIN) 10 MG tablet Take 1 tablet by mouth 2 times daily for 30 days. Max Daily Amount: 20 mg 60 tablet 0    [START ON 4/8/2023] methylphenidate (RITALIN) 10 MG tablet Take 1 tablet by mouth 2 times daily for 30 days. Max Daily Amount: 20 mg 60 tablet 0    [START ON 5/8/2023] methylphenidate (RITALIN) 10 MG tablet Take 1 tablet by mouth 2 times daily for 30 days.  Max Daily Amount: 20 mg 60 tablet 0 cetirizine (ZYRTEC) 10 MG tablet Take 10 mg by mouth daily      melatonin 5 MG TABS tablet Take 5 mg by mouth      promethazine (PHENERGAN) 25 MG suppository Place 25 mg rectally every 6 hours as needed       No current facility-administered medications for this visit. Vitals:    /82 (Site: Left Upper Arm, Position: Sitting, Cuff Size: Small Adult)   Pulse 94   Ht 5' 8\" (1.727 m)   Wt 186 lb (84.4 kg)   SpO2 98%   BMI 28.28 kg/m²     Physical Exam:  Physical Exam  Vitals reviewed. Constitutional:       General: He is not in acute distress. Appearance: Normal appearance. He is not ill-appearing, toxic-appearing or diaphoretic. HENT:      Head: Normocephalic and atraumatic. Right Ear: External ear normal. There is no impacted cerumen. Left Ear: External ear normal. There is no impacted cerumen. Nose: Nose normal. No congestion or rhinorrhea. Mouth/Throat:      Mouth: Mucous membranes are moist.      Pharynx: No oropharyngeal exudate or posterior oropharyngeal erythema. Eyes:      General: No scleral icterus. Right eye: No discharge. Left eye: No discharge. Extraocular Movements: Extraocular movements intact. Conjunctiva/sclera: Conjunctivae normal.      Pupils: Pupils are equal, round, and reactive to light. Cardiovascular:      Rate and Rhythm: Normal rate and regular rhythm. Pulses: Normal pulses. Heart sounds: Normal heart sounds. No murmur heard. No friction rub. No gallop. Pulmonary:      Effort: Pulmonary effort is normal. No respiratory distress. Breath sounds: Normal breath sounds. No stridor. No wheezing, rhonchi or rales. Chest:      Chest wall: No tenderness. Abdominal:      General: Abdomen is flat. Bowel sounds are normal. There is no distension. Palpations: Abdomen is soft. There is no mass. Tenderness: There is no abdominal tenderness.  There is no right CVA tenderness, left CVA tenderness, guarding or rebound. Musculoskeletal:         General: No swelling, tenderness, deformity or signs of injury. Cervical back: Neck supple. No rigidity or tenderness. Right lower leg: No edema. Left lower leg: No edema. Lymphadenopathy:      Cervical: No cervical adenopathy. Skin:     General: Skin is warm and dry. Coloration: Skin is not jaundiced or pale. Findings: No bruising, erythema, lesion or rash. Neurological:      General: No focal deficit present. Mental Status: He is alert. Mental status is at baseline. Motor: No weakness. Coordination: Coordination normal.      Gait: Gait normal.   Psychiatric:         Mood and Affect: Mood normal.         Behavior: Behavior normal.         Thought Content: Thought content normal.         Judgment: Judgment normal.       Assessment/Plan:     ICD-10-CM    1. Attention deficit hyperactivity disorder (ADHD), combined type  F90.2 methylphenidate (RITALIN) 10 MG tablet     methylphenidate (RITALIN) 10 MG tablet     methylphenidate (RITALIN) 10 MG tablet      2. Seasonal allergic rhinitis, unspecified trigger  J30.2 azelastine (ASTELIN) 0.1 % nasal spray      3. Migraine without aura and without status migrainosus, not intractable  G43.009 topiramate (TOPAMAX) 200 MG tablet     naproxen (NAPROSYN) 500 MG tablet     SUMAtriptan (IMITREX) 25 MG tablet      4. Chronic GERD  K21.9 omeprazole (PRILOSEC) 20 MG delayed release capsule     ondansetron (ZOFRAN) 4 MG tablet      5. Tourette's disorder  F95.2 cloNIDine (CATAPRES) 0.3 MG tablet      6. Chronic anxiety  F41.9 escitalopram (LEXAPRO) 10 MG tablet           Problem List          Respiratory    Seasonal allergic rhinitis      Doing well on flonase. Ok to add AH during pollen season.           Relevant Medications    cetirizine (ZYRTEC) 10 MG tablet    promethazine (PHENERGAN) 25 MG suppository    fluticasone (FLONASE) 50 MCG/ACT nasal spray    azelastine (ASTELIN) 0.1 % nasal spray Digestive    Chronic GERD     Improved on ppi. Educated on lifestyle modifications with instructions to reduce large meals, particularly before bedtime; reduce spicy foods, caffeine, alcohol and acidic foods/drinks, avoid smoking. Relevant Medications    omeprazole (PRILOSEC) 20 MG delayed release capsule    ondansetron (ZOFRAN) 4 MG tablet       Nervous and Auditory    Migraine without aura and without status migrainosus, not intractable      Frequency is <4 monthly. Plan to continue his current regimen. Relevant Medications    escitalopram (LEXAPRO) 10 MG tablet    topiramate (TOPAMAX) 200 MG tablet    naproxen (NAPROSYN) 500 MG tablet    SUMAtriptan (IMITREX) 25 MG tablet    Tourette's disorder      Pt is doing well on his current regimen. No changes. Relevant Medications    cloNIDine (CATAPRES) 0.3 MG tablet       Other    Attention deficit hyperactivity disorder (ADHD), combined type - Primary      Will increase dose to BID given his school schedule M-T 5pm -10pm. Discussed timing for 2nd dose. Relevant Medications    methylphenidate (RITALIN) 10 MG tablet    methylphenidate (RITALIN) 10 MG tablet (Start on 2023)    methylphenidate (RITALIN) 10 MG tablet (Start on 2023)    Chronic anxiety      Stable on current lexapro dose. Relevant Medications    escitalopram (LEXAPRO) 10 MG tablet        Cosme Saucedo III, MD        Twyla Laurent (: 2000) is a 21 y.o. male is here for evaluation of the following chief complaint(s): ADHD (Pt started night classes and would like to increase Ritalin dose), Anxiety, and Migraine    Assessment/Plan:   1. Attention deficit hyperactivity disorder (ADHD), combined type  Assessment & Plan: Will increase dose to BID given his school schedule M-T 5pm -10pm. Discussed timing for 2nd dose. Orders:  -     methylphenidate (RITALIN) 10 MG tablet; Take 1 tablet by mouth 2 times daily for 30 days.  Max Daily Amount: 20 mg, Disp-60 tablet, R-0Normal  -     methylphenidate (RITALIN) 10 MG tablet; Take 1 tablet by mouth 2 times daily for 30 days. Max Daily Amount: 20 mg, Disp-60 tablet, R-0Normal  -     methylphenidate (RITALIN) 10 MG tablet; Take 1 tablet by mouth 2 times daily for 30 days. Max Daily Amount: 20 mg, Disp-60 tablet, R-0Normal  2. Seasonal allergic rhinitis, unspecified trigger  Assessment & Plan:   Doing well on flonase. Ok to add AH during pollen season. Orders:  -     azelastine (ASTELIN) 0.1 % nasal spray; 1 spray by Nasal route 2 times daily Use in each nostril as directed, Disp-60 mL, R-1Normal  3. Migraine without aura and without status migrainosus, not intractable  Assessment & Plan:   Frequency is <4 monthly. Plan to continue his current regimen. Orders:  -     topiramate (TOPAMAX) 200 MG tablet; Take 1 tablet by mouth nightly, Disp-90 tablet, R-3Normal  -     naproxen (NAPROSYN) 500 MG tablet; Take 1 tablet by mouth 2 times daily (with meals), Disp-180 tablet, R-3Normal  -     SUMAtriptan (IMITREX) 25 MG tablet; Take 1 tablet by mouth once as needed for Migraine, Disp-27 tablet, R-3Normal  4. Chronic GERD  Assessment & Plan:  Improved on ppi. Educated on lifestyle modifications with instructions to reduce large meals, particularly before bedtime; reduce spicy foods, caffeine, alcohol and acidic foods/drinks, avoid smoking. Orders:  -     omeprazole (PRILOSEC) 20 MG delayed release capsule; Take 1 capsule by mouth once daily, Disp-90 capsule, R-3Normal  -     ondansetron (ZOFRAN) 4 MG tablet; Take 1 tablet by mouth daily as needed for Nausea or Vomiting, Disp-30 tablet, R-3Normal  5. Tourette's disorder  Assessment & Plan:   Pt is doing well on his current regimen. No changes. Orders:  -     cloNIDine (CATAPRES) 0.3 MG tablet; Take 1 tablet by mouth twice daily, Disp-180 tablet, R-3Normal  6. Chronic anxiety  Assessment & Plan:   Stable on current lexapro dose.    Orders:  - escitalopram (LEXAPRO) 10 MG tablet; Take 1 tablet by mouth once daily, Disp-90 tablet, R-3Normal    No follow-ups on file. Subjective/Objective:   Since last visit: no change. Medication compliance: all of the time. Side effects from medication include: none.  has been reviewed. /82 (Site: Left Upper Arm, Position: Sitting, Cuff Size: Small Adult)   Pulse 94   Ht 5' 8\" (1.727 m)   Wt 186 lb (84.4 kg)   SpO2 98%   BMI 28.28 kg/m²       An electronic signature was used to authenticate this note.   -- Terri Campos MD

## 2023-03-31 DIAGNOSIS — K21.9 CHRONIC GERD: Primary | ICD-10-CM

## 2023-03-31 DIAGNOSIS — F90.2 ATTENTION DEFICIT HYPERACTIVITY DISORDER (ADHD), COMBINED TYPE: ICD-10-CM

## 2023-03-31 RX ORDER — METHYLPHENIDATE HYDROCHLORIDE 10 MG/1
10 TABLET ORAL 2 TIMES DAILY
Qty: 60 TABLET | Refills: 0 | Status: SHIPPED | OUTPATIENT
Start: 2023-05-08 | End: 2023-06-07

## 2023-03-31 RX ORDER — METHYLPHENIDATE HYDROCHLORIDE 10 MG/1
10 TABLET ORAL 2 TIMES DAILY
Qty: 60 TABLET | Refills: 0 | Status: SHIPPED | OUTPATIENT
Start: 2023-04-08 | End: 2023-05-08

## 2023-03-31 RX ORDER — METHYLPHENIDATE HYDROCHLORIDE 10 MG/1
10 TABLET ORAL 2 TIMES DAILY
Qty: 60 TABLET | Refills: 0 | Status: SHIPPED | OUTPATIENT
Start: 2023-03-31 | End: 2023-04-30

## 2023-03-31 RX ORDER — ONDANSETRON 4 MG/1
4 TABLET, ORALLY DISINTEGRATING ORAL EVERY 8 HOURS PRN
Qty: 30 TABLET | Refills: 5 | Status: SHIPPED | OUTPATIENT
Start: 2023-03-31

## 2023-03-31 NOTE — TELEPHONE ENCOUNTER
Pt's mom left a  requesting ritalin to be transferred to [facility] on [address] due to availability. Requested Prescriptions     Pending Prescriptions Disp Refills    methylphenidate (RITALIN) 10 MG tablet 60 tablet 0     Sig: Take 1 tablet by mouth 2 times daily for 30 days. Max Daily Amount: 20 mg    methylphenidate (RITALIN) 10 MG tablet 60 tablet 0     Sig: Take 1 tablet by mouth 2 times daily for 30 days. Max Daily Amount: 20 mg    methylphenidate (RITALIN) 10 MG tablet 60 tablet 0     Sig: Take 1 tablet by mouth 2 times daily for 30 days. Max Daily Amount: 20 mg    ondansetron (ZOFRAN-ODT) disintegrating tablet 4 mg           Pt's mom also requested zofran to be sent in as ODT, and send that one to their normal pharmacy at [facility] on [address].         Kevin Kemp, [address] [place] [address] [place]

## 2023-06-19 ENCOUNTER — OFFICE VISIT (OUTPATIENT)
Dept: FAMILY MEDICINE CLINIC | Facility: CLINIC | Age: 23
End: 2023-06-19
Payer: COMMERCIAL

## 2023-06-19 VITALS
BODY MASS INDEX: 27.43 KG/M2 | DIASTOLIC BLOOD PRESSURE: 68 MMHG | SYSTOLIC BLOOD PRESSURE: 112 MMHG | WEIGHT: 181 LBS | HEIGHT: 68 IN

## 2023-06-19 DIAGNOSIS — G43.009 MIGRAINE WITHOUT AURA AND WITHOUT STATUS MIGRAINOSUS, NOT INTRACTABLE: ICD-10-CM

## 2023-06-19 DIAGNOSIS — K21.9 CHRONIC GERD: ICD-10-CM

## 2023-06-19 DIAGNOSIS — F95.2 TOURETTE'S DISORDER: ICD-10-CM

## 2023-06-19 DIAGNOSIS — F90.2 ATTENTION DEFICIT HYPERACTIVITY DISORDER (ADHD), COMBINED TYPE: Primary | ICD-10-CM

## 2023-06-19 PROCEDURE — 1036F TOBACCO NON-USER: CPT | Performed by: FAMILY MEDICINE

## 2023-06-19 PROCEDURE — 99214 OFFICE O/P EST MOD 30 MIN: CPT | Performed by: FAMILY MEDICINE

## 2023-06-19 PROCEDURE — G8427 DOCREV CUR MEDS BY ELIG CLIN: HCPCS | Performed by: FAMILY MEDICINE

## 2023-06-19 PROCEDURE — G8419 CALC BMI OUT NRM PARAM NOF/U: HCPCS | Performed by: FAMILY MEDICINE

## 2023-06-19 RX ORDER — METHYLPHENIDATE HYDROCHLORIDE 10 MG/1
10 TABLET ORAL 2 TIMES DAILY
Qty: 60 TABLET | Refills: 0 | Status: SHIPPED | OUTPATIENT
Start: 2023-06-19 | End: 2023-07-19

## 2023-06-19 RX ORDER — ONDANSETRON 8 MG/1
8 TABLET, ORALLY DISINTEGRATING ORAL EVERY 8 HOURS PRN
Qty: 30 TABLET | Refills: 3 | Status: SHIPPED | OUTPATIENT
Start: 2023-06-19

## 2023-06-19 RX ORDER — ERENUMAB-AOOE 70 MG/ML
70 INJECTION SUBCUTANEOUS
Qty: 3 ADJUSTABLE DOSE PRE-FILLED PEN SYRINGE | Refills: 3 | Status: SHIPPED | OUTPATIENT
Start: 2023-06-19

## 2023-06-19 RX ORDER — OMEPRAZOLE 20 MG/1
CAPSULE, DELAYED RELEASE ORAL
Qty: 90 CAPSULE | Refills: 3 | Status: SHIPPED | OUTPATIENT
Start: 2023-06-19

## 2023-06-19 RX ORDER — METHYLPHENIDATE HYDROCHLORIDE 10 MG/1
10 TABLET ORAL 2 TIMES DAILY
COMMUNITY

## 2023-06-19 RX ORDER — METHYLPHENIDATE HYDROCHLORIDE 10 MG/1
10 TABLET ORAL 2 TIMES DAILY
Qty: 60 TABLET | Refills: 0 | Status: SHIPPED | OUTPATIENT
Start: 2023-07-19 | End: 2023-08-18

## 2023-06-19 RX ORDER — METHYLPHENIDATE HYDROCHLORIDE 10 MG/1
10 TABLET ORAL 2 TIMES DAILY
Qty: 60 TABLET | Refills: 0 | Status: SHIPPED | OUTPATIENT
Start: 2023-08-18 | End: 2023-09-17

## 2023-06-19 RX ORDER — SUMATRIPTAN 50 MG/1
50 TABLET, FILM COATED ORAL
Qty: 9 TABLET | Refills: 3 | Status: SHIPPED | OUTPATIENT
Start: 2023-06-19 | End: 2023-06-19

## 2023-06-19 ASSESSMENT — ENCOUNTER SYMPTOMS
SHORTNESS OF BREATH: 0
VOMITING: 0
STRIDOR: 0
NAUSEA: 0
WHEEZING: 0
FACIAL SWELLING: 0
DIARRHEA: 0
EYE REDNESS: 0
ABDOMINAL PAIN: 0
EYE ITCHING: 0
EYE DISCHARGE: 0
CHEST TIGHTNESS: 0
SORE THROAT: 0
SINUS PAIN: 0
ABDOMINAL DISTENTION: 0
COUGH: 0
EYE PAIN: 0
COLOR CHANGE: 0
BLOOD IN STOOL: 0
RHINORRHEA: 0
BACK PAIN: 0
SINUS PRESSURE: 0
CONSTIPATION: 0

## 2023-06-19 NOTE — PROGRESS NOTES
number of years only once daily. Reports some increased s/s lately, but cannot attribute to any lifestyle/dietary changes. - eczema- battled for a number of years. Since switching to free and clear detergent, pt's eczema has greatly improved. Responding well to triamcinolone prn.      - excessive cerumen- pt has used debrox in the past. Tends to have difficulty clearing. Review of Systems:  Review of Systems   Constitutional:  Negative for activity change, appetite change, chills, diaphoresis, fatigue, fever and unexpected weight change. HENT:  Negative for congestion, ear discharge, ear pain, facial swelling, hearing loss, mouth sores, nosebleeds, postnasal drip, rhinorrhea, sinus pressure, sinus pain, sore throat and tinnitus. Eyes:  Negative for pain, discharge, redness, itching and visual disturbance. Respiratory:  Negative for cough, chest tightness, shortness of breath, wheezing and stridor. Cardiovascular:  Negative for chest pain, palpitations and leg swelling. Gastrointestinal:  Negative for abdominal distention, abdominal pain, blood in stool, constipation, diarrhea, nausea and vomiting. Endocrine: Negative for cold intolerance, heat intolerance, polydipsia, polyphagia and polyuria. Genitourinary:  Negative for decreased urine volume, difficulty urinating, dysuria, flank pain, frequency, hematuria and urgency. Musculoskeletal:  Negative for arthralgias, back pain, gait problem, joint swelling, myalgias and neck pain. Skin:  Negative for color change, pallor, rash and wound. Neurological:  Positive for headaches. Negative for dizziness, tremors, seizures, syncope, facial asymmetry, speech difficulty, weakness, light-headedness and numbness. Psychiatric/Behavioral:  Negative for agitation, behavioral problems, confusion, hallucinations, self-injury, sleep disturbance and suicidal ideas. The patient is not nervous/anxious.        History:  Past Medical History:   Diagnosis

## 2023-06-20 NOTE — ASSESSMENT & PLAN NOTE
Uncontrolled despite topamax. Agrees to trial of amovig. Advised to use imitrex as abortive and since 25 mg is not effective, increase to 50mg. Administration instructions reinforced.

## 2023-06-20 NOTE — ASSESSMENT & PLAN NOTE
Recurrent s/s if he stops meds. Educated on lifestyle modifications with instructions to reduce large meals, particularly before bedtime; reduce spicy foods, caffeine, alcohol and acidic foods/drinks, avoid smoking.

## 2023-09-19 ENCOUNTER — OFFICE VISIT (OUTPATIENT)
Dept: FAMILY MEDICINE CLINIC | Facility: CLINIC | Age: 23
End: 2023-09-19
Payer: COMMERCIAL

## 2023-09-19 VITALS
DIASTOLIC BLOOD PRESSURE: 70 MMHG | SYSTOLIC BLOOD PRESSURE: 108 MMHG | HEIGHT: 68 IN | WEIGHT: 175 LBS | BODY MASS INDEX: 26.52 KG/M2

## 2023-09-19 DIAGNOSIS — G43.009 MIGRAINE WITHOUT AURA AND WITHOUT STATUS MIGRAINOSUS, NOT INTRACTABLE: ICD-10-CM

## 2023-09-19 DIAGNOSIS — F90.2 ATTENTION DEFICIT HYPERACTIVITY DISORDER (ADHD), COMBINED TYPE: Primary | ICD-10-CM

## 2023-09-19 DIAGNOSIS — L30.9 ECZEMA OF BOTH HANDS: ICD-10-CM

## 2023-09-19 DIAGNOSIS — F41.9 CHRONIC ANXIETY: ICD-10-CM

## 2023-09-19 DIAGNOSIS — F95.2 TOURETTE'S DISORDER: ICD-10-CM

## 2023-09-19 DIAGNOSIS — K21.9 CHRONIC GERD: ICD-10-CM

## 2023-09-19 DIAGNOSIS — F95.2 TOURETTE'S: ICD-10-CM

## 2023-09-19 PROCEDURE — 1036F TOBACCO NON-USER: CPT | Performed by: FAMILY MEDICINE

## 2023-09-19 PROCEDURE — 99214 OFFICE O/P EST MOD 30 MIN: CPT | Performed by: FAMILY MEDICINE

## 2023-09-19 PROCEDURE — G8427 DOCREV CUR MEDS BY ELIG CLIN: HCPCS | Performed by: FAMILY MEDICINE

## 2023-09-19 PROCEDURE — G8419 CALC BMI OUT NRM PARAM NOF/U: HCPCS | Performed by: FAMILY MEDICINE

## 2023-09-19 RX ORDER — METHYLPHENIDATE HYDROCHLORIDE 10 MG/1
10 TABLET ORAL 2 TIMES DAILY
Qty: 60 TABLET | Refills: 0 | Status: SHIPPED | OUTPATIENT
Start: 2023-09-19 | End: 2023-10-19

## 2023-09-19 RX ORDER — METHYLPHENIDATE HYDROCHLORIDE 10 MG/1
10 TABLET ORAL 2 TIMES DAILY
Qty: 60 TABLET | Refills: 0 | Status: SHIPPED | OUTPATIENT
Start: 2023-11-18 | End: 2023-12-18

## 2023-09-19 RX ORDER — METHYLPHENIDATE HYDROCHLORIDE 10 MG/1
10 TABLET ORAL 2 TIMES DAILY
Qty: 60 TABLET | Refills: 0 | Status: SHIPPED | OUTPATIENT
Start: 2023-10-19 | End: 2023-11-18

## 2023-09-19 ASSESSMENT — ENCOUNTER SYMPTOMS
COUGH: 0
EYE REDNESS: 0
FACIAL SWELLING: 0
STRIDOR: 0
SINUS PRESSURE: 0
VOMITING: 0
EYE DISCHARGE: 0
WHEEZING: 0
ABDOMINAL PAIN: 0
SORE THROAT: 0
DIARRHEA: 0
NAUSEA: 0
RHINORRHEA: 0
ABDOMINAL DISTENTION: 0
EYE ITCHING: 0
SHORTNESS OF BREATH: 0
SINUS PAIN: 0
CONSTIPATION: 0
BLOOD IN STOOL: 0
BACK PAIN: 0
COLOR CHANGE: 0
CHEST TIGHTNESS: 0
EYE PAIN: 0

## 2023-09-19 NOTE — PROGRESS NOTES
Cervical: No cervical adenopathy. Skin:     General: Skin is warm and dry. Coloration: Skin is not jaundiced or pale. Findings: No bruising, erythema, lesion or rash. Neurological:      General: No focal deficit present. Mental Status: He is alert. Mental status is at baseline. Motor: No weakness. Coordination: Coordination normal.      Gait: Gait normal.   Psychiatric:         Mood and Affect: Mood normal. Affect is flat. Behavior: Behavior normal.         Thought Content: Thought content normal.         Judgment: Judgment normal.         Assessment/Plan:     ICD-10-CM    1. Attention deficit hyperactivity disorder (ADHD), combined type  F90.2 methylphenidate (RITALIN) 10 MG tablet     methylphenidate (RITALIN) 10 MG tablet     methylphenidate (RITALIN) 10 MG tablet      2. Migraine without aura and without status migrainosus, not intractable  G43.009       3. Tourette's disorder  F95.2       4. Chronic GERD  K21.9       5. Chronic anxiety  F41.9       6. Eczema of both hands  L30.9       7. Tourette's  F95.2            Problem List          Digestive    Chronic GERD      Ok to continue daily ppi. Educated on lifestyle modifications with instructions to reduce large meals, particularly before bedtime; reduce spicy foods, caffeine, alcohol and acidic foods/drinks, avoid smoking. Relevant Medications    ondansetron (ZOFRAN) 4 MG tablet    ondansetron (ZOFRAN-ODT) 4 MG disintegrating tablet    ondansetron (ZOFRAN-ODT) 8 MG TBDP disintegrating tablet    omeprazole (PRILOSEC) 20 MG delayed release capsule       Nervous and Auditory    Tourette's      Doing well on current regimen. Will continue monitoring          Migraine without aura and without status migrainosus, not intractable      This combo has worked well for abortive tx and prevention.           Relevant Medications    escitalopram (LEXAPRO) 10 MG tablet    naproxen (NAPROSYN) 500 MG tablet    ondansetron

## 2023-09-19 NOTE — ASSESSMENT & PLAN NOTE
Encouraged to switch to hypoallergenic Dove white bar soap, free and clear laundry detergent, avoid fabric softener dryer sheets and fragranced cosmetics, apply petroleum jelly to affected areas prn to keep moist.  Triamcinolone if flare.

## 2023-09-19 NOTE — ASSESSMENT & PLAN NOTE
Will try new pharmacy to hopefully improve odds of obtaining. Consider switch although that may not be in the best interest of Yamil given hx.

## 2023-09-19 NOTE — ASSESSMENT & PLAN NOTE
Ok to continue daily ppi. Educated on lifestyle modifications with instructions to reduce large meals, particularly before bedtime; reduce spicy foods, caffeine, alcohol and acidic foods/drinks, avoid smoking.

## 2023-10-26 ENCOUNTER — OFFICE VISIT (OUTPATIENT)
Dept: FAMILY MEDICINE CLINIC | Facility: CLINIC | Age: 23
End: 2023-10-26
Payer: COMMERCIAL

## 2023-10-26 VITALS
SYSTOLIC BLOOD PRESSURE: 104 MMHG | DIASTOLIC BLOOD PRESSURE: 70 MMHG | WEIGHT: 165 LBS | HEIGHT: 68 IN | BODY MASS INDEX: 25.01 KG/M2 | TEMPERATURE: 98.5 F

## 2023-10-26 DIAGNOSIS — R19.7 DIARRHEA, UNSPECIFIED TYPE: Primary | ICD-10-CM

## 2023-10-26 DIAGNOSIS — K21.9 CHRONIC GERD: ICD-10-CM

## 2023-10-26 DIAGNOSIS — R11.0 NAUSEATED: ICD-10-CM

## 2023-10-26 LAB
INFLUENZA A ANTIGEN, POC: NEGATIVE
INFLUENZA B ANTIGEN, POC: NEGATIVE
LOT EXPIRE DATE: NORMAL
LOT KIT NUMBER: NORMAL
SARS-COV-2 RNA, POC: NEGATIVE
VALID INTERNAL CONTROL: YES
VENDOR AND KIT NAME POC: NORMAL

## 2023-10-26 PROCEDURE — 87428 SARSCOV & INF VIR A&B AG IA: CPT | Performed by: FAMILY MEDICINE

## 2023-10-26 PROCEDURE — 99214 OFFICE O/P EST MOD 30 MIN: CPT | Performed by: FAMILY MEDICINE

## 2023-10-26 PROCEDURE — G8484 FLU IMMUNIZE NO ADMIN: HCPCS | Performed by: FAMILY MEDICINE

## 2023-10-26 PROCEDURE — G8419 CALC BMI OUT NRM PARAM NOF/U: HCPCS | Performed by: FAMILY MEDICINE

## 2023-10-26 PROCEDURE — G8427 DOCREV CUR MEDS BY ELIG CLIN: HCPCS | Performed by: FAMILY MEDICINE

## 2023-10-26 PROCEDURE — 1036F TOBACCO NON-USER: CPT | Performed by: FAMILY MEDICINE

## 2023-10-26 RX ORDER — FAMOTIDINE 20 MG/1
20 TABLET, FILM COATED ORAL 2 TIMES DAILY
Qty: 60 TABLET | Refills: 2 | Status: SHIPPED | OUTPATIENT
Start: 2023-10-26

## 2023-10-26 ASSESSMENT — ENCOUNTER SYMPTOMS
ABDOMINAL DISTENTION: 0
NAUSEA: 0
BLOATING: 0
EYE ITCHING: 0
BLOOD IN STOOL: 0
COLOR CHANGE: 0
SINUS PRESSURE: 0
VOMITING: 0
SHORTNESS OF BREATH: 0
COUGH: 0
SORE THROAT: 0
EYE PAIN: 0
FACIAL SWELLING: 0
DIARRHEA: 1
ABDOMINAL PAIN: 0
WHEEZING: 0
CHEST TIGHTNESS: 0
EYE DISCHARGE: 0
EYE REDNESS: 0
CONSTIPATION: 0
SINUS PAIN: 0
RHINORRHEA: 0
FLATUS: 0
BACK PAIN: 0
STRIDOR: 0

## 2023-10-26 NOTE — ASSESSMENT & PLAN NOTE
Only soft stool. No sick contacts, travel or dietary changes. Instructed to use imodium prn only if watery. RTC if fever, abd pain, worsening s/s.

## 2023-10-26 NOTE — ASSESSMENT & PLAN NOTE
Worsening. Continue ppi. Add famotidine. Educated on lifestyle modifications with instructions to reduce large meals, particularly before bedtime; reduce spicy foods, caffeine, alcohol and acidic foods/drinks, avoid smoking.

## 2023-10-26 NOTE — ASSESSMENT & PLAN NOTE
Ok to use home zofran or phenergan if needed. Recommended full liquid diet, transition to soft diet if tolerating and then to brat.

## 2023-10-26 NOTE — PROGRESS NOTES
performed in visit on 10/26/23   AMB POC SARS-COV-2 AND INFLUENZA A/B   Result Value Ref Range    VALID INTERNAL CONTROL Yes     Lot/Kit Number -     Lot/Kit  date: -     SARS-COV-2 RNA, POC Negative     Influenza A Antigen, POC Negative Negative    Influenza B Antigen, POC Negative Negative    Vendor and kit name [employer]            Review of Systems:  Review of Systems   Constitutional:  Positive for chills. Negative for activity change, appetite change, diaphoresis, fatigue, fever, unexpected weight change and weight loss. HENT:  Negative for congestion, ear discharge, ear pain, facial swelling, hearing loss, mouth sores, nosebleeds, postnasal drip, rhinorrhea, sinus pressure, sinus pain, sore throat and tinnitus. Eyes:  Negative for pain, discharge, redness, itching and visual disturbance. Respiratory:  Negative for cough, chest tightness, shortness of breath, wheezing and stridor. Cardiovascular:  Negative for chest pain, palpitations and leg swelling. Gastrointestinal:  Positive for diarrhea (soft stool). Negative for abdominal distention, abdominal pain, bloating, blood in stool, constipation, flatus, nausea and vomiting. Endocrine: Negative for cold intolerance, heat intolerance, polydipsia, polyphagia and polyuria. Genitourinary:  Negative for decreased urine volume, difficulty urinating, dysuria, flank pain, frequency, hematuria and urgency. Musculoskeletal:  Negative for arthralgias, back pain, gait problem, joint swelling, myalgias and neck pain. Skin:  Negative for color change, pallor, rash and wound. Neurological:  Negative for dizziness, tremors, seizures, syncope, facial asymmetry, speech difficulty, weakness, light-headedness, numbness and headaches. Psychiatric/Behavioral:  Negative for agitation, behavioral problems, confusion, hallucinations, self-injury, sleep disturbance and suicidal ideas. The patient is not nervous/anxious.          History:  Past Medical History:

## 2024-02-13 ENCOUNTER — TELEMEDICINE (OUTPATIENT)
Dept: FAMILY MEDICINE CLINIC | Facility: CLINIC | Age: 24
End: 2024-02-13
Payer: COMMERCIAL

## 2024-02-13 DIAGNOSIS — J02.9 SORE THROAT: ICD-10-CM

## 2024-02-13 DIAGNOSIS — J02.0 ACUTE STREPTOCOCCAL PHARYNGITIS: Primary | ICD-10-CM

## 2024-02-13 DIAGNOSIS — R11.2 NAUSEA AND VOMITING, UNSPECIFIED VOMITING TYPE: ICD-10-CM

## 2024-02-13 LAB
GROUP A STREP ANTIGEN, POC: POSITIVE
INFLUENZA A ANTIGEN, POC: NEGATIVE
INFLUENZA B ANTIGEN, POC: NEGATIVE
LOT EXPIRE DATE: NORMAL
LOT KIT NUMBER: NORMAL
SARS-COV-2 RNA, POC: NEGATIVE
VALID INTERNAL CONTROL, POC: YES
VALID INTERNAL CONTROL: YES
VENDOR AND KIT NAME POC: NORMAL

## 2024-02-13 PROCEDURE — 99214 OFFICE O/P EST MOD 30 MIN: CPT | Performed by: FAMILY MEDICINE

## 2024-02-13 PROCEDURE — G8427 DOCREV CUR MEDS BY ELIG CLIN: HCPCS | Performed by: FAMILY MEDICINE

## 2024-02-13 PROCEDURE — 87880 STREP A ASSAY W/OPTIC: CPT | Performed by: FAMILY MEDICINE

## 2024-02-13 PROCEDURE — 87428 SARSCOV & INF VIR A&B AG IA: CPT | Performed by: FAMILY MEDICINE

## 2024-02-13 RX ORDER — AMOXICILLIN 500 MG/1
1000 CAPSULE ORAL DAILY
Qty: 14 CAPSULE | Refills: 0 | Status: SHIPPED | OUTPATIENT
Start: 2024-02-13 | End: 2024-02-20

## 2024-02-13 NOTE — PROGRESS NOTES
amoxicillin (AMOXIL) 500 MG capsule; Take 2 capsules by mouth daily for 7 days    Nausea and vomiting, unspecified vomiting type  -     AMB POC RAPID STREP A  -     AMB POC SARS-COV-2 AND INFLUENZA A/B    Sore throat  -     AMB POC RAPID STREP A  -     AMB POC SARS-COV-2 AND INFLUENZA A/B           No problem-specific Assessment & Plan notes found for this encounter.         We discussed the expected course, resolution and complications of the diagnosis(es) in detail.  Medication risks, benefits, costs, interactions, and alternatives were discussed as indicated.  I advised him to contact the office if his condition worsens, changes or fails to improve as anticipated. He expressed understanding with the diagnosis(es) and plan.       Yamil Alonzo Mehran, was evaluated through a synchronous (real-time) audio-video encounter. The patient (or guardian if applicable) is aware that this is a billable service. Verbal consent to proceed has been obtained within the past 12 months. The visit was conducted pursuant to the emergency declaration under the [place] Act and the National Emergencies Act, 1135 waiver authority and the Coronavirus Preparedness and Response Supplemental Appropriations Act.  Patient identification was verified, and a caregiver was present when appropriate. The patient was located in a state where the provider was credentialed to provide care.    This visit was completely virtually using doxy.kenneth Feliciano III, MD

## 2024-03-20 ENCOUNTER — OFFICE VISIT (OUTPATIENT)
Dept: FAMILY MEDICINE CLINIC | Facility: CLINIC | Age: 24
End: 2024-03-20
Payer: COMMERCIAL

## 2024-03-20 VITALS
WEIGHT: 173 LBS | HEIGHT: 68 IN | BODY MASS INDEX: 26.22 KG/M2 | DIASTOLIC BLOOD PRESSURE: 60 MMHG | SYSTOLIC BLOOD PRESSURE: 124 MMHG

## 2024-03-20 DIAGNOSIS — F95.2 TOURETTE'S DISORDER: ICD-10-CM

## 2024-03-20 DIAGNOSIS — G43.009 MIGRAINE WITHOUT AURA AND WITHOUT STATUS MIGRAINOSUS, NOT INTRACTABLE: ICD-10-CM

## 2024-03-20 DIAGNOSIS — F90.2 ATTENTION DEFICIT HYPERACTIVITY DISORDER (ADHD), COMBINED TYPE: Primary | ICD-10-CM

## 2024-03-20 DIAGNOSIS — K21.9 CHRONIC GERD: ICD-10-CM

## 2024-03-20 PROCEDURE — 99214 OFFICE O/P EST MOD 30 MIN: CPT | Performed by: FAMILY MEDICINE

## 2024-03-20 PROCEDURE — 1036F TOBACCO NON-USER: CPT | Performed by: FAMILY MEDICINE

## 2024-03-20 PROCEDURE — G8419 CALC BMI OUT NRM PARAM NOF/U: HCPCS | Performed by: FAMILY MEDICINE

## 2024-03-20 PROCEDURE — G8427 DOCREV CUR MEDS BY ELIG CLIN: HCPCS | Performed by: FAMILY MEDICINE

## 2024-03-20 PROCEDURE — G8484 FLU IMMUNIZE NO ADMIN: HCPCS | Performed by: FAMILY MEDICINE

## 2024-03-20 RX ORDER — METHYLPHENIDATE HYDROCHLORIDE 20 MG/1
20 TABLET ORAL 2 TIMES DAILY
Qty: 60 TABLET | Refills: 0 | Status: SHIPPED | OUTPATIENT
Start: 2024-05-19 | End: 2024-06-18

## 2024-03-20 RX ORDER — METHYLPHENIDATE HYDROCHLORIDE 20 MG/1
20 TABLET ORAL 2 TIMES DAILY
Qty: 60 TABLET | Refills: 0 | Status: SHIPPED | OUTPATIENT
Start: 2024-04-19 | End: 2024-05-19

## 2024-03-20 RX ORDER — METHYLPHENIDATE HYDROCHLORIDE 20 MG/1
20 TABLET ORAL 2 TIMES DAILY
Qty: 60 TABLET | Refills: 0 | Status: SHIPPED | OUTPATIENT
Start: 2024-03-20 | End: 2024-04-19

## 2024-03-20 SDOH — ECONOMIC STABILITY: INCOME INSECURITY: HOW HARD IS IT FOR YOU TO PAY FOR THE VERY BASICS LIKE FOOD, HOUSING, MEDICAL CARE, AND HEATING?: NOT VERY HARD

## 2024-03-20 SDOH — ECONOMIC STABILITY: FOOD INSECURITY: WITHIN THE PAST 12 MONTHS, THE FOOD YOU BOUGHT JUST DIDN'T LAST AND YOU DIDN'T HAVE MONEY TO GET MORE.: NEVER TRUE

## 2024-03-20 SDOH — ECONOMIC STABILITY: FOOD INSECURITY: WITHIN THE PAST 12 MONTHS, YOU WORRIED THAT YOUR FOOD WOULD RUN OUT BEFORE YOU GOT MONEY TO BUY MORE.: NEVER TRUE

## 2024-03-20 ASSESSMENT — ENCOUNTER SYMPTOMS
DIARRHEA: 0
CHEST TIGHTNESS: 0
VOMITING: 0
EYE ITCHING: 0
SORE THROAT: 0
CONSTIPATION: 0
NAUSEA: 0
ABDOMINAL DISTENTION: 0
ABDOMINAL PAIN: 0
STRIDOR: 0
EYE PAIN: 0
SINUS PAIN: 0
EYE REDNESS: 0
BLOOD IN STOOL: 0
BACK PAIN: 0
WHEEZING: 0
SINUS PRESSURE: 0
COLOR CHANGE: 0
RHINORRHEA: 0
EYE DISCHARGE: 0
FACIAL SWELLING: 0
SHORTNESS OF BREATH: 0

## 2024-03-20 ASSESSMENT — PATIENT HEALTH QUESTIONNAIRE - PHQ9
SUM OF ALL RESPONSES TO PHQ9 QUESTIONS 1 & 2: 0
SUM OF ALL RESPONSES TO PHQ QUESTIONS 1-9: 0
1. LITTLE INTEREST OR PLEASURE IN DOING THINGS: NOT AT ALL
SUM OF ALL RESPONSES TO PHQ QUESTIONS 1-9: 0
SUM OF ALL RESPONSES TO PHQ QUESTIONS 1-9: 0
2. FEELING DOWN, DEPRESSED OR HOPELESS: NOT AT ALL
SUM OF ALL RESPONSES TO PHQ QUESTIONS 1-9: 0

## 2024-03-20 NOTE — PROGRESS NOTES
(173 lb)   BMI 26.30 kg/m²     Physical Exam:  Physical Exam  Vitals reviewed.   Constitutional:       General: He is not in acute distress.     Appearance: Normal appearance. He is not ill-appearing.   HENT:      Head: Normocephalic and atraumatic.      Right Ear: External ear normal.      Left Ear: External ear normal.      Nose: Nose normal.      Mouth/Throat:      Mouth: Mucous membranes are moist.      Pharynx: No oropharyngeal exudate.   Eyes:      General: No scleral icterus.        Right eye: No discharge.         Left eye: No discharge.      Conjunctiva/sclera: Conjunctivae normal.   Cardiovascular:      Rate and Rhythm: Normal rate and regular rhythm.      Heart sounds: No murmur heard.     No friction rub. No gallop.   Pulmonary:      Effort: No respiratory distress.      Breath sounds: No wheezing or rales.   Abdominal:      General: Abdomen is flat. There is no distension.   Musculoskeletal:         General: No deformity.      Cervical back: Neck supple.   Lymphadenopathy:      Cervical: No cervical adenopathy.   Skin:     Coloration: Skin is not jaundiced or pale.   Neurological:      Mental Status: He is alert and oriented to person, place, and time.      Gait: Gait normal.   Psychiatric:         Mood and Affect: Mood normal.         Behavior: Behavior normal.         Thought Content: Thought content normal.         Judgment: Judgment normal.         Assessment/Plan:     ICD-10-CM    1. Attention deficit hyperactivity disorder (ADHD), combined type  F90.2 methylphenidate (RITALIN) 20 MG tablet     methylphenidate (RITALIN) 20 MG tablet     methylphenidate (RITALIN) 20 MG tablet      2. Migraine without aura and without status migrainosus, not intractable  G43.009       3. Chronic GERD  K21.9       4. Tourette's disorder  F95.2            Problem List          Digestive    Chronic GERD      Well-controlled, continue current medications and continue current treatment plan         Relevant Medications

## 2024-07-09 ENCOUNTER — OFFICE VISIT (OUTPATIENT)
Dept: FAMILY MEDICINE CLINIC | Facility: CLINIC | Age: 24
End: 2024-07-09
Payer: COMMERCIAL

## 2024-07-09 VITALS
DIASTOLIC BLOOD PRESSURE: 84 MMHG | SYSTOLIC BLOOD PRESSURE: 110 MMHG | HEIGHT: 68 IN | WEIGHT: 167 LBS | BODY MASS INDEX: 25.31 KG/M2

## 2024-07-09 DIAGNOSIS — F95.2 TOURETTE'S DISORDER: ICD-10-CM

## 2024-07-09 DIAGNOSIS — F90.2 ATTENTION DEFICIT HYPERACTIVITY DISORDER (ADHD), COMBINED TYPE: Primary | ICD-10-CM

## 2024-07-09 DIAGNOSIS — K21.9 CHRONIC GERD: ICD-10-CM

## 2024-07-09 DIAGNOSIS — Z00.00 ENCOUNTER FOR ANNUAL PHYSICAL EXAM: ICD-10-CM

## 2024-07-09 DIAGNOSIS — F41.9 CHRONIC ANXIETY: ICD-10-CM

## 2024-07-09 DIAGNOSIS — G43.009 MIGRAINE WITHOUT AURA AND WITHOUT STATUS MIGRAINOSUS, NOT INTRACTABLE: ICD-10-CM

## 2024-07-09 DIAGNOSIS — Z00.00 ANNUAL PHYSICAL EXAM: ICD-10-CM

## 2024-07-09 DIAGNOSIS — Z13.6 SCREENING FOR HEART DISEASE: ICD-10-CM

## 2024-07-09 PROCEDURE — 99214 OFFICE O/P EST MOD 30 MIN: CPT | Performed by: FAMILY MEDICINE

## 2024-07-09 PROCEDURE — G8427 DOCREV CUR MEDS BY ELIG CLIN: HCPCS | Performed by: FAMILY MEDICINE

## 2024-07-09 PROCEDURE — 1036F TOBACCO NON-USER: CPT | Performed by: FAMILY MEDICINE

## 2024-07-09 PROCEDURE — G8419 CALC BMI OUT NRM PARAM NOF/U: HCPCS | Performed by: FAMILY MEDICINE

## 2024-07-09 RX ORDER — METHYLPHENIDATE HYDROCHLORIDE 20 MG/1
20 TABLET ORAL 2 TIMES DAILY
Qty: 60 TABLET | Refills: 0 | Status: SHIPPED | OUTPATIENT
Start: 2024-08-08 | End: 2024-09-07

## 2024-07-09 RX ORDER — METHYLPHENIDATE HYDROCHLORIDE 20 MG/1
20 TABLET ORAL 2 TIMES DAILY
Status: CANCELLED | OUTPATIENT
Start: 2024-07-09

## 2024-07-09 RX ORDER — CLONIDINE HYDROCHLORIDE 0.3 MG/1
TABLET ORAL
Qty: 180 TABLET | Refills: 3 | Status: SHIPPED | OUTPATIENT
Start: 2024-07-09

## 2024-07-09 RX ORDER — METHYLPHENIDATE HYDROCHLORIDE 20 MG/1
20 TABLET ORAL 2 TIMES DAILY
COMMUNITY
End: 2024-07-09

## 2024-07-09 RX ORDER — METHYLPHENIDATE HYDROCHLORIDE 20 MG/1
20 TABLET ORAL 2 TIMES DAILY
Qty: 60 TABLET | Refills: 0 | Status: SHIPPED | OUTPATIENT
Start: 2024-07-09 | End: 2024-08-08

## 2024-07-09 RX ORDER — METHYLPHENIDATE HYDROCHLORIDE 20 MG/1
20 TABLET ORAL 2 TIMES DAILY
Qty: 60 TABLET | Refills: 0 | Status: SHIPPED | OUTPATIENT
Start: 2024-09-07 | End: 2024-10-07

## 2024-07-09 ASSESSMENT — ENCOUNTER SYMPTOMS
ABDOMINAL PAIN: 0
CHEST TIGHTNESS: 0
DIARRHEA: 0
CONSTIPATION: 0
SHORTNESS OF BREATH: 0
FACIAL SWELLING: 0
EYE REDNESS: 0
STRIDOR: 0
WHEEZING: 0
EYE DISCHARGE: 0
BACK PAIN: 0
SINUS PAIN: 0
VOMITING: 0
EYE ITCHING: 0
RHINORRHEA: 0
COUGH: 0
SINUS PRESSURE: 0
NAUSEA: 0
EYE PAIN: 0
ABDOMINAL DISTENTION: 0
SORE THROAT: 0
COLOR CHANGE: 0
BLOOD IN STOOL: 0

## 2024-07-09 NOTE — ASSESSMENT & PLAN NOTE
Stop ppi. Continue famotidine 20 mg x 2 every evening as he has early a.m. s/s. If no improvement, may need to restart ppi. Educated on lifestyle modifications with instructions to reduce large meals, particularly before bedtime; reduce spicy foods, caffeine, alcohol and acidic foods/drinks, avoid smoking.

## 2024-07-09 NOTE — PROGRESS NOTES
Respiratory:  Negative for cough, chest tightness, shortness of breath, wheezing and stridor.    Cardiovascular:  Negative for chest pain, palpitations and leg swelling.   Gastrointestinal:  Negative for abdominal distention, abdominal pain, blood in stool, constipation, diarrhea, nausea and vomiting.   Endocrine: Negative for cold intolerance, heat intolerance, polydipsia, polyphagia and polyuria.   Genitourinary:  Negative for decreased urine volume, difficulty urinating, dysuria, flank pain, frequency, hematuria and urgency.   Musculoskeletal:  Negative for arthralgias, back pain, gait problem, joint swelling, myalgias and neck pain.   Skin:  Negative for color change, pallor, rash and wound.   Neurological:  Negative for dizziness, tremors, seizures, syncope, facial asymmetry, speech difficulty, weakness, light-headedness, numbness and headaches.   Psychiatric/Behavioral:  Negative for agitation, behavioral problems, confusion, hallucinations, self-injury, sleep disturbance and suicidal ideas. The patient is not nervous/anxious.         History:  Past Medical History:   Diagnosis Date    ADD (attention deficit disorder)     Allergic rhinitis     Asthma     Migraine     Tourette's        Past Surgical History:   Procedure Laterality Date    ADENOIDECTOMY      IR BRONCHOSCOPY      TYMPANOPLASTY      WISDOM TOOTH EXTRACTION  2019       Family History   Problem Relation Age of Onset    Thyroid Disease Mother     Diabetes Father        Social History     Tobacco Use    Smoking status: Never    Smokeless tobacco: Never   Substance Use Topics    Alcohol use: Not Currently       Allergies   Allergen Reactions    Latex Rash       Current Outpatient Medications   Medication Sig Dispense Refill    cloNIDine (CATAPRES) 0.3 MG tablet Take 1 tablet by mouth twice daily 180 tablet 3    methylphenidate (RITALIN) 20 MG tablet Take 1 tablet by mouth 2 times daily for 30 days. Max Daily Amount: 40 mg 60 tablet 0    [START ON

## 2024-10-09 ENCOUNTER — LAB (OUTPATIENT)
Dept: FAMILY MEDICINE CLINIC | Facility: CLINIC | Age: 24
End: 2024-10-09

## 2024-10-09 DIAGNOSIS — Z00.00 ENCOUNTER FOR ANNUAL PHYSICAL EXAM: ICD-10-CM

## 2024-10-09 DIAGNOSIS — Z00.00 ANNUAL PHYSICAL EXAM: ICD-10-CM

## 2024-10-09 DIAGNOSIS — Z13.6 SCREENING FOR HEART DISEASE: ICD-10-CM

## 2024-10-09 LAB
ALBUMIN SERPL-MCNC: 4 G/DL (ref 3.5–5)
ALBUMIN/GLOB SERPL: 1.6 (ref 1–1.9)
ALP SERPL-CCNC: 81 U/L (ref 40–129)
ALT SERPL-CCNC: 21 U/L (ref 8–55)
ANION GAP SERPL CALC-SCNC: 9 MMOL/L (ref 9–18)
AST SERPL-CCNC: 21 U/L (ref 15–37)
BASOPHILS # BLD: 0.1 K/UL (ref 0–0.2)
BASOPHILS NFR BLD: 1 % (ref 0–2)
BILIRUB SERPL-MCNC: 0.3 MG/DL (ref 0–1.2)
BUN SERPL-MCNC: 19 MG/DL (ref 6–23)
CALCIUM SERPL-MCNC: 9.2 MG/DL (ref 8.8–10.2)
CHLORIDE SERPL-SCNC: 111 MMOL/L (ref 98–107)
CHOLEST SERPL-MCNC: 128 MG/DL (ref 0–200)
CO2 SERPL-SCNC: 21 MMOL/L (ref 20–28)
CREAT SERPL-MCNC: 0.9 MG/DL (ref 0.8–1.3)
DIFFERENTIAL METHOD BLD: NORMAL
EOSINOPHIL # BLD: 0.2 K/UL (ref 0–0.8)
EOSINOPHIL NFR BLD: 3 % (ref 0.5–7.8)
ERYTHROCYTE [DISTWIDTH] IN BLOOD BY AUTOMATED COUNT: 12.4 % (ref 11.9–14.6)
GLOBULIN SER CALC-MCNC: 2.5 G/DL (ref 2.3–3.5)
GLUCOSE SERPL-MCNC: 98 MG/DL (ref 70–99)
HCT VFR BLD AUTO: 43.1 % (ref 41.1–50.3)
HDLC SERPL-MCNC: 23 MG/DL (ref 40–60)
HDLC SERPL: 5.5 (ref 0–5)
HGB BLD-MCNC: 14.5 G/DL (ref 13.6–17.2)
IMM GRANULOCYTES # BLD AUTO: 0 K/UL (ref 0–0.5)
IMM GRANULOCYTES NFR BLD AUTO: 0 % (ref 0–5)
LDLC SERPL CALC-MCNC: 82 MG/DL (ref 0–100)
LYMPHOCYTES # BLD: 2.2 K/UL (ref 0.5–4.6)
LYMPHOCYTES NFR BLD: 34 % (ref 13–44)
MCH RBC QN AUTO: 29.4 PG (ref 26.1–32.9)
MCHC RBC AUTO-ENTMCNC: 33.6 G/DL (ref 31.4–35)
MCV RBC AUTO: 87.2 FL (ref 82–102)
MONOCYTES # BLD: 0.7 K/UL (ref 0.1–1.3)
MONOCYTES NFR BLD: 11 % (ref 4–12)
NEUTS SEG # BLD: 3.2 K/UL (ref 1.7–8.2)
NEUTS SEG NFR BLD: 51 % (ref 43–78)
NRBC # BLD: 0 K/UL (ref 0–0.2)
PLATELET # BLD AUTO: 176 K/UL (ref 150–450)
PMV BLD AUTO: 11.9 FL (ref 9.4–12.3)
POTASSIUM SERPL-SCNC: 4.1 MMOL/L (ref 3.5–5.1)
PROT SERPL-MCNC: 6.4 G/DL (ref 6.3–8.2)
RBC # BLD AUTO: 4.94 M/UL (ref 4.23–5.6)
SODIUM SERPL-SCNC: 141 MMOL/L (ref 136–145)
TRIGL SERPL-MCNC: 112 MG/DL (ref 0–150)
TSH, 3RD GENERATION: 1.21 UIU/ML (ref 0.27–4.2)
VLDLC SERPL CALC-MCNC: 22 MG/DL (ref 6–23)
WBC # BLD AUTO: 6.3 K/UL (ref 4.3–11.1)

## 2024-10-16 ENCOUNTER — OFFICE VISIT (OUTPATIENT)
Dept: FAMILY MEDICINE CLINIC | Facility: CLINIC | Age: 24
End: 2024-10-16

## 2024-10-16 VITALS
WEIGHT: 166 LBS | HEIGHT: 68 IN | BODY MASS INDEX: 25.16 KG/M2 | SYSTOLIC BLOOD PRESSURE: 104 MMHG | DIASTOLIC BLOOD PRESSURE: 60 MMHG

## 2024-10-16 DIAGNOSIS — F90.2 ATTENTION DEFICIT HYPERACTIVITY DISORDER (ADHD), COMBINED TYPE: ICD-10-CM

## 2024-10-16 DIAGNOSIS — G43.009 MIGRAINE WITHOUT AURA AND WITHOUT STATUS MIGRAINOSUS, NOT INTRACTABLE: ICD-10-CM

## 2024-10-16 DIAGNOSIS — Z23 FLU VACCINE NEED: ICD-10-CM

## 2024-10-16 DIAGNOSIS — Z00.00 ANNUAL PHYSICAL EXAM: Primary | ICD-10-CM

## 2024-10-16 RX ORDER — METHYLPHENIDATE HYDROCHLORIDE 10 MG/1
10 TABLET ORAL 2 TIMES DAILY
Qty: 60 TABLET | Refills: 0 | Status: SHIPPED | OUTPATIENT
Start: 2024-11-15 | End: 2024-12-15

## 2024-10-16 RX ORDER — ERENUMAB-AOOE 70 MG/ML
70 INJECTION SUBCUTANEOUS
Qty: 3 ADJUSTABLE DOSE PRE-FILLED PEN SYRINGE | Refills: 3 | Status: SHIPPED | OUTPATIENT
Start: 2024-10-16 | End: 2024-10-16

## 2024-10-16 RX ORDER — ERENUMAB-AOOE 70 MG/ML
70 INJECTION SUBCUTANEOUS
Qty: 3 ADJUSTABLE DOSE PRE-FILLED PEN SYRINGE | Refills: 3 | Status: SHIPPED | OUTPATIENT
Start: 2024-10-16

## 2024-10-16 RX ORDER — METHYLPHENIDATE HYDROCHLORIDE 10 MG/1
10 TABLET ORAL 2 TIMES DAILY
Qty: 60 TABLET | Refills: 0 | Status: SHIPPED | OUTPATIENT
Start: 2024-10-16 | End: 2024-11-15

## 2024-10-16 RX ORDER — METHYLPHENIDATE HYDROCHLORIDE 10 MG/1
10 TABLET ORAL 2 TIMES DAILY
Qty: 60 TABLET | Refills: 0 | Status: SHIPPED | OUTPATIENT
Start: 2024-12-15 | End: 2025-01-14

## 2024-10-16 ASSESSMENT — ENCOUNTER SYMPTOMS
STRIDOR: 0
CONSTIPATION: 0
COLOR CHANGE: 0
SORE THROAT: 0
SHORTNESS OF BREATH: 0
RHINORRHEA: 0
VOMITING: 0
DIARRHEA: 0
SINUS PAIN: 0
SINUS PRESSURE: 0
BLOOD IN STOOL: 0
ABDOMINAL DISTENTION: 0
NAUSEA: 0
BACK PAIN: 0
WHEEZING: 0
EYE PAIN: 0
EYE REDNESS: 0
EYE DISCHARGE: 0
CHEST TIGHTNESS: 0
COUGH: 0
EYE ITCHING: 0
FACIAL SWELLING: 0
ABDOMINAL PAIN: 0

## 2024-10-16 NOTE — ASSESSMENT & PLAN NOTE
Encouraged 5 servings of fruits and veggies daily. Instructed to drink approx 2 L of fluid daily, mostly water. Recommended 30 sustained minutes of aerobic exercise daily (e.g. cycling, rowing, jogging). Limit high calorie processed foods, red meat, egg yolks, dairy products, butter, mckeon, fried and fast foods. Recommended influenza vaccination annually.

## 2024-10-16 NOTE — ASSESSMENT & PLAN NOTE
Chronic, at goal (stable), continue current treatment plan  Orders:    Erenumab-aooe (AIMOVIG) 70 MG/ML SOAJ; Inject 70 mg into the skin every 30 days

## 2024-10-16 NOTE — ASSESSMENT & PLAN NOTE
Chronic, at goal (stable), continue current treatment plan  Orders:    methylphenidate (RITALIN) 10 MG tablet; Take 1 tablet by mouth 2 times daily for 30 days. Max Daily Amount: 20 mg    methylphenidate (RITALIN) 10 MG tablet; Take 1 tablet by mouth 2 times daily for 30 days. Max Daily Amount: 20 mg    methylphenidate (RITALIN) 10 MG tablet; Take 1 tablet by mouth 2 times daily for 30 days. Max Daily Amount: 20 mg

## 2024-10-28 ENCOUNTER — TELEPHONE (OUTPATIENT)
Dept: FAMILY MEDICINE CLINIC | Facility: CLINIC | Age: 24
End: 2024-10-28

## 2024-10-28 RX ORDER — GALCANEZUMAB 120 MG/ML
120 INJECTION, SOLUTION SUBCUTANEOUS
Qty: 3 ADJUSTABLE DOSE PRE-FILLED PEN SYRINGE | Refills: 3 | Status: SHIPPED | OUTPATIENT
Start: 2024-10-28

## 2024-10-28 NOTE — TELEPHONE ENCOUNTER
Aimovig not preferred. Insurance is asking that you change to one of these preferred alternatives:  AJOVY, EMGALITY, QULIPTA

## 2024-11-15 PROBLEM — Z00.00 ANNUAL PHYSICAL EXAM: Status: RESOLVED | Noted: 2024-10-16 | Resolved: 2024-11-15

## 2025-01-23 ENCOUNTER — OFFICE VISIT (OUTPATIENT)
Dept: FAMILY MEDICINE CLINIC | Facility: CLINIC | Age: 25
End: 2025-01-23
Payer: COMMERCIAL

## 2025-01-23 VITALS
BODY MASS INDEX: 24.86 KG/M2 | SYSTOLIC BLOOD PRESSURE: 110 MMHG | DIASTOLIC BLOOD PRESSURE: 68 MMHG | WEIGHT: 164 LBS | HEIGHT: 68 IN

## 2025-01-23 DIAGNOSIS — F41.9 CHRONIC ANXIETY: ICD-10-CM

## 2025-01-23 DIAGNOSIS — K21.9 CHRONIC GERD: ICD-10-CM

## 2025-01-23 DIAGNOSIS — F95.2 TOURETTE'S DISORDER: ICD-10-CM

## 2025-01-23 DIAGNOSIS — F90.2 ATTENTION DEFICIT HYPERACTIVITY DISORDER (ADHD), COMBINED TYPE: Primary | ICD-10-CM

## 2025-01-23 DIAGNOSIS — G43.009 MIGRAINE WITHOUT AURA AND WITHOUT STATUS MIGRAINOSUS, NOT INTRACTABLE: ICD-10-CM

## 2025-01-23 PROCEDURE — 99214 OFFICE O/P EST MOD 30 MIN: CPT | Performed by: FAMILY MEDICINE

## 2025-01-23 RX ORDER — METHYLPHENIDATE HYDROCHLORIDE 10 MG/1
10 TABLET ORAL DAILY
Qty: 30 TABLET | Refills: 0 | Status: SHIPPED | OUTPATIENT
Start: 2025-02-22 | End: 2025-03-24

## 2025-01-23 RX ORDER — METHYLPHENIDATE HYDROCHLORIDE 10 MG/1
10 TABLET ORAL DAILY
Qty: 30 TABLET | Refills: 0 | Status: SHIPPED | OUTPATIENT
Start: 2025-01-23 | End: 2025-02-22

## 2025-01-23 RX ORDER — METHYLPHENIDATE HYDROCHLORIDE 10 MG/1
10 TABLET ORAL DAILY
Qty: 30 TABLET | Refills: 0 | Status: SHIPPED | OUTPATIENT
Start: 2025-03-24 | End: 2025-04-23

## 2025-01-23 RX ORDER — FAMOTIDINE 20 MG/1
20 TABLET, FILM COATED ORAL 2 TIMES DAILY
Qty: 180 TABLET | Refills: 3 | Status: SHIPPED | OUTPATIENT
Start: 2025-01-23

## 2025-01-23 SDOH — ECONOMIC STABILITY: FOOD INSECURITY: WITHIN THE PAST 12 MONTHS, THE FOOD YOU BOUGHT JUST DIDN'T LAST AND YOU DIDN'T HAVE MONEY TO GET MORE.: NEVER TRUE

## 2025-01-23 SDOH — ECONOMIC STABILITY: FOOD INSECURITY: WITHIN THE PAST 12 MONTHS, YOU WORRIED THAT YOUR FOOD WOULD RUN OUT BEFORE YOU GOT MONEY TO BUY MORE.: NEVER TRUE

## 2025-01-23 ASSESSMENT — PATIENT HEALTH QUESTIONNAIRE - PHQ9
1. LITTLE INTEREST OR PLEASURE IN DOING THINGS: NOT AT ALL
2. FEELING DOWN, DEPRESSED OR HOPELESS: NOT AT ALL
SUM OF ALL RESPONSES TO PHQ QUESTIONS 1-9: 0
SUM OF ALL RESPONSES TO PHQ9 QUESTIONS 1 & 2: 0

## 2025-01-23 ASSESSMENT — ENCOUNTER SYMPTOMS
EYE PAIN: 0
FACIAL SWELLING: 0
COLOR CHANGE: 0
BACK PAIN: 0
WHEEZING: 0
EYE REDNESS: 0
EYE DISCHARGE: 0
ABDOMINAL PAIN: 0
CONSTIPATION: 0
BLOOD IN STOOL: 0
SINUS PAIN: 0
EYE ITCHING: 0
SINUS PRESSURE: 0
DIARRHEA: 0
STRIDOR: 0
CHEST TIGHTNESS: 0
RHINORRHEA: 0
COUGH: 0
NAUSEA: 0
ABDOMINAL DISTENTION: 0
VOMITING: 0
SHORTNESS OF BREATH: 0
SORE THROAT: 0

## 2025-01-23 NOTE — ASSESSMENT & PLAN NOTE
Continue famotidine 20 mg x 2 every evening as he has early a.m. s/s. If no improvement, may need to restart ppi. Educated on lifestyle modifications with instructions to reduce large meals, particularly before bedtime; reduce spicy foods, caffeine, alcohol and acidic foods/drinks, avoid smoking.     Orders:    famotidine (PEPCID) 20 MG tablet; Take 1 tablet by mouth 2 times daily

## 2025-01-23 NOTE — ASSESSMENT & PLAN NOTE
Orders:    methylphenidate (RITALIN) 10 MG tablet; Take 1 tablet by mouth daily for 30 days. Max Daily Amount: 10 mg    methylphenidate (RITALIN) 10 MG tablet; Take 1 tablet by mouth daily for 30 days. Max Daily Amount: 10 mg    methylphenidate (RITALIN) 10 MG tablet; Take 1 tablet by mouth daily for 30 days. Max Daily Amount: 10 mg

## 2025-01-23 NOTE — PROGRESS NOTES
[facility]  _______________________________________  Delfin Jones MD                 [address] [place] [address]        R[place] [address]                                                                                    Phone: [phone]                                                                                    Fax: [phone]    Yamil Laurent is a 24 y.o. male who is seen for evaluation of   Chief Complaint   Patient presents with    ADHD    Gastroesophageal Reflux    Mental Health Problem    Discuss Medications     Emgality & Aimovig not covered by new insurance, Pt does not know what would be covered, would like to discuss options with the doctor       HPI:   Yamil is seen today for f/u.      - ADHD- states s/s are controlled on this dose of ritalin but he is now out of school and wishes to taper back down.      - Tourette's/OCD- clonidine and lexapro controlling s/s per pt.      - migraines- was controlled with aimovig. New insurance will not cover. On PA [employer] told us they covered emgality but refused the rx after PA completed. Frequency and intensity of migraines is now worse off controller agent.      - GERD- prilosec for a number of years only once daily. Still using omeprazole with good s/s control.      - eczema- battled for a number of years. Since switching to free and clear detergent, pt's eczema has greatly improved. Responding well to triamcinolone prn. No recent problems with rash. Has not required the triamcinolone.                  Review of Systems:  Review of Systems   Constitutional:  Negative for activity change, appetite change, chills, diaphoresis, fatigue, fever and unexpected weight change.   HENT:  Negative for congestion, ear discharge, ear pain, facial swelling, hearing loss, mouth sores, nosebleeds, postnasal drip, rhinorrhea, sinus pressure, sinus pain, sore throat and tinnitus.    Eyes:  Negative for

## 2025-01-23 NOTE — ASSESSMENT & PLAN NOTE
Uncontrolled. Will start appeal for new controller agent. Continue imitrex and naprosyn prn. Zofran or phenergan for h/a-associated nausea.

## 2025-02-11 ENCOUNTER — OFFICE VISIT (OUTPATIENT)
Dept: FAMILY MEDICINE CLINIC | Facility: CLINIC | Age: 25
End: 2025-02-11
Payer: COMMERCIAL

## 2025-02-11 VITALS
DIASTOLIC BLOOD PRESSURE: 80 MMHG | HEIGHT: 68 IN | WEIGHT: 163 LBS | SYSTOLIC BLOOD PRESSURE: 118 MMHG | BODY MASS INDEX: 24.71 KG/M2

## 2025-02-11 DIAGNOSIS — G44.52 NEW DAILY PERSISTENT HEADACHE: Primary | ICD-10-CM

## 2025-02-11 PROCEDURE — 99213 OFFICE O/P EST LOW 20 MIN: CPT | Performed by: FAMILY MEDICINE

## 2025-02-11 ASSESSMENT — ENCOUNTER SYMPTOMS
CHEST TIGHTNESS: 0
EYE ITCHING: 0
WHEEZING: 0
COUGH: 0
BLOOD IN STOOL: 0
EYE DISCHARGE: 0
COLOR CHANGE: 0
SORE THROAT: 0
ABDOMINAL DISTENTION: 0
FACIAL SWELLING: 0
SHORTNESS OF BREATH: 0
SINUS PAIN: 0
VOMITING: 0
NAUSEA: 0
RHINORRHEA: 0
EYE PAIN: 0
EYE REDNESS: 0
ABDOMINAL PAIN: 0
DIARRHEA: 0
CONSTIPATION: 0
STRIDOR: 0
BACK PAIN: 0
SINUS PRESSURE: 0

## 2025-02-11 NOTE — ASSESSMENT & PLAN NOTE
DD includes atypical migraine. Recommended trial of imitrex. May use naproxen if no response. Appeal to insurance has been submitted. Additional letter of appeal sent requesting coverage be reconsidered.

## 2025-02-11 NOTE — PROGRESS NOTES
[facility]  _______________________________________  Delfin Jones MD                 [address] [place] [address]        Rachid Feliciano [place] [address]                                                                                    Phone: [phone]                                                                                    Fax: [phone]    Yamil Laurent is a 24 y.o. male who is seen for evaluation of   Chief Complaint   Patient presents with    Headache     Pain at the top of head off and on X several days. No known injury and no other symptoms        HPI:   - c/o new onset h/a, upper L parietal region, no radiation. No photophobia, phonophobia. He has not tried naproxen or imitrex for the h/a. Incidentally, his migraine controller medication was not covered by insurance. Denies any vision changes, syncope, facial asymmetry.         Review of Systems:  Review of Systems   Constitutional:  Negative for activity change, appetite change, chills, diaphoresis, fatigue, fever and unexpected weight change.   HENT:  Negative for congestion, ear discharge, ear pain, facial swelling, hearing loss, mouth sores, nosebleeds, postnasal drip, rhinorrhea, sinus pressure, sinus pain, sore throat and tinnitus.    Eyes:  Negative for pain, discharge, redness, itching and visual disturbance.   Respiratory:  Negative for cough, chest tightness, shortness of breath, wheezing and stridor.    Cardiovascular:  Negative for chest pain, palpitations and leg swelling.   Gastrointestinal:  Negative for abdominal distention, abdominal pain, blood in stool, constipation, diarrhea, nausea and vomiting.   Endocrine: Negative for cold intolerance, heat intolerance, polydipsia, polyphagia and polyuria.   Genitourinary:  Negative for decreased urine volume, difficulty urinating, dysuria, flank pain, frequency, hematuria and urgency.   Musculoskeletal:  Negative for arthralgias,

## 2025-05-02 ENCOUNTER — OFFICE VISIT (OUTPATIENT)
Dept: FAMILY MEDICINE CLINIC | Facility: CLINIC | Age: 25
End: 2025-05-02
Payer: COMMERCIAL

## 2025-05-02 VITALS
SYSTOLIC BLOOD PRESSURE: 120 MMHG | BODY MASS INDEX: 24.71 KG/M2 | WEIGHT: 163 LBS | HEIGHT: 68 IN | DIASTOLIC BLOOD PRESSURE: 70 MMHG

## 2025-05-02 DIAGNOSIS — F90.2 ATTENTION DEFICIT HYPERACTIVITY DISORDER (ADHD), COMBINED TYPE: Primary | ICD-10-CM

## 2025-05-02 DIAGNOSIS — F41.9 CHRONIC ANXIETY: ICD-10-CM

## 2025-05-02 DIAGNOSIS — K21.9 CHRONIC GERD: ICD-10-CM

## 2025-05-02 DIAGNOSIS — G43.009 MIGRAINE WITHOUT AURA AND WITHOUT STATUS MIGRAINOSUS, NOT INTRACTABLE: ICD-10-CM

## 2025-05-02 PROCEDURE — 99214 OFFICE O/P EST MOD 30 MIN: CPT | Performed by: FAMILY MEDICINE

## 2025-05-02 RX ORDER — GALCANEZUMAB 120 MG/ML
120 INJECTION, SOLUTION SUBCUTANEOUS
Qty: 3 ADJUSTABLE DOSE PRE-FILLED PEN SYRINGE | Refills: 3 | Status: SHIPPED | OUTPATIENT
Start: 2025-05-02

## 2025-05-02 RX ORDER — METHYLPHENIDATE HYDROCHLORIDE 10 MG/1
10 TABLET ORAL EVERY MORNING
Qty: 30 TABLET | Refills: 0 | Status: SHIPPED | OUTPATIENT
Start: 2025-06-01 | End: 2025-07-01

## 2025-05-02 RX ORDER — METHYLPHENIDATE HYDROCHLORIDE 10 MG/1
10 TABLET ORAL EVERY MORNING
Qty: 30 TABLET | Refills: 0 | Status: SHIPPED | OUTPATIENT
Start: 2025-05-02 | End: 2025-06-01

## 2025-05-02 RX ORDER — METHYLPHENIDATE HYDROCHLORIDE 10 MG/1
10 TABLET ORAL EVERY MORNING
Qty: 30 TABLET | Refills: 0 | Status: SHIPPED | OUTPATIENT
Start: 2025-07-01 | End: 2025-07-31

## 2025-05-02 ASSESSMENT — ENCOUNTER SYMPTOMS
COUGH: 0
VOMITING: 0
BACK PAIN: 0
NAUSEA: 0
DIARRHEA: 0
EYE REDNESS: 0
EYE PAIN: 0
EYE DISCHARGE: 0
STRIDOR: 0
WHEEZING: 0
CONSTIPATION: 0
FACIAL SWELLING: 0
SHORTNESS OF BREATH: 0
COLOR CHANGE: 0
ABDOMINAL PAIN: 0
EYE ITCHING: 0
SINUS PAIN: 0
ABDOMINAL DISTENTION: 0
SINUS PRESSURE: 0
RHINORRHEA: 0
CHEST TIGHTNESS: 0
SORE THROAT: 0
BLOOD IN STOOL: 0

## 2025-05-02 ASSESSMENT — PATIENT HEALTH QUESTIONNAIRE - PHQ9
2. FEELING DOWN, DEPRESSED OR HOPELESS: NOT AT ALL
SUM OF ALL RESPONSES TO PHQ QUESTIONS 1-9: 0
1. LITTLE INTEREST OR PLEASURE IN DOING THINGS: NOT AT ALL
SUM OF ALL RESPONSES TO PHQ QUESTIONS 1-9: 0

## 2025-05-02 NOTE — ASSESSMENT & PLAN NOTE
Chronic, at goal (stable), continue current treatment plan Educated on lifestyle modifications with instructions to reduce large meals, particularly before bedtime; reduce spicy foods, caffeine, alcohol and acidic foods/drinks, avoid smoking.

## 2025-08-20 ENCOUNTER — OFFICE VISIT (OUTPATIENT)
Dept: FAMILY MEDICINE CLINIC | Facility: CLINIC | Age: 25
End: 2025-08-20
Payer: COMMERCIAL

## 2025-08-20 VITALS
WEIGHT: 154 LBS | HEIGHT: 68 IN | DIASTOLIC BLOOD PRESSURE: 74 MMHG | SYSTOLIC BLOOD PRESSURE: 114 MMHG | BODY MASS INDEX: 23.34 KG/M2

## 2025-08-20 DIAGNOSIS — F41.9 CHRONIC ANXIETY: Primary | ICD-10-CM

## 2025-08-20 DIAGNOSIS — K21.9 CHRONIC GERD: ICD-10-CM

## 2025-08-20 DIAGNOSIS — F90.2 ATTENTION DEFICIT HYPERACTIVITY DISORDER (ADHD), COMBINED TYPE: ICD-10-CM

## 2025-08-20 DIAGNOSIS — Z00.00 ENCOUNTER FOR ANNUAL PHYSICAL EXAM: ICD-10-CM

## 2025-08-20 DIAGNOSIS — Z00.00 ANNUAL PHYSICAL EXAM: ICD-10-CM

## 2025-08-20 DIAGNOSIS — G43.009 MIGRAINE WITHOUT AURA AND WITHOUT STATUS MIGRAINOSUS, NOT INTRACTABLE: ICD-10-CM

## 2025-08-20 DIAGNOSIS — Z13.6 SCREENING FOR HEART DISEASE: ICD-10-CM

## 2025-08-20 PROCEDURE — 99214 OFFICE O/P EST MOD 30 MIN: CPT | Performed by: FAMILY MEDICINE

## 2025-08-20 RX ORDER — METHYLPHENIDATE HYDROCHLORIDE 10 MG/1
10 TABLET ORAL EVERY MORNING
Qty: 30 TABLET | Refills: 0 | Status: SHIPPED | OUTPATIENT
Start: 2025-10-19 | End: 2025-11-18

## 2025-08-20 RX ORDER — ESCITALOPRAM OXALATE 10 MG/1
TABLET ORAL
Qty: 90 TABLET | Refills: 3 | Status: SHIPPED | OUTPATIENT
Start: 2025-08-20

## 2025-08-20 RX ORDER — METHYLPHENIDATE HYDROCHLORIDE 10 MG/1
10 TABLET ORAL EVERY MORNING
Qty: 30 TABLET | Refills: 0 | Status: SHIPPED | OUTPATIENT
Start: 2025-08-20 | End: 2025-09-19

## 2025-08-20 RX ORDER — METHYLPHENIDATE HYDROCHLORIDE 10 MG/1
10 TABLET ORAL EVERY MORNING
Qty: 30 TABLET | Refills: 0 | Status: SHIPPED | OUTPATIENT
Start: 2025-09-19 | End: 2025-10-19

## 2025-08-20 ASSESSMENT — ENCOUNTER SYMPTOMS
WHEEZING: 0
CHEST TIGHTNESS: 0
NAUSEA: 0
SINUS PRESSURE: 0
SINUS PAIN: 0
EYE REDNESS: 0
ABDOMINAL DISTENTION: 0
EYE ITCHING: 0
BLOOD IN STOOL: 0
VOMITING: 0
EYE DISCHARGE: 0
RHINORRHEA: 0
BACK PAIN: 0
SORE THROAT: 0
COUGH: 0
CONSTIPATION: 0
FACIAL SWELLING: 0
DIARRHEA: 0
STRIDOR: 0
ABDOMINAL PAIN: 0
SHORTNESS OF BREATH: 0
COLOR CHANGE: 0
EYE PAIN: 0

## 2025-08-20 ASSESSMENT — PATIENT HEALTH QUESTIONNAIRE - PHQ9
SUM OF ALL RESPONSES TO PHQ QUESTIONS 1-9: 0
1. LITTLE INTEREST OR PLEASURE IN DOING THINGS: NOT AT ALL
2. FEELING DOWN, DEPRESSED OR HOPELESS: NOT AT ALL